# Patient Record
Sex: FEMALE | Race: OTHER | HISPANIC OR LATINO
[De-identification: names, ages, dates, MRNs, and addresses within clinical notes are randomized per-mention and may not be internally consistent; named-entity substitution may affect disease eponyms.]

---

## 2018-10-18 PROBLEM — Z00.00 ENCOUNTER FOR PREVENTIVE HEALTH EXAMINATION: Status: ACTIVE | Noted: 2018-10-18

## 2018-10-29 ENCOUNTER — APPOINTMENT (OUTPATIENT)
Dept: OTOLARYNGOLOGY | Facility: CLINIC | Age: 73
End: 2018-10-29
Payer: MEDICARE

## 2018-10-29 VITALS — SYSTOLIC BLOOD PRESSURE: 128 MMHG | DIASTOLIC BLOOD PRESSURE: 77 MMHG | HEIGHT: 64 IN

## 2018-10-29 VITALS — BODY MASS INDEX: 24.03 KG/M2 | WEIGHT: 140 LBS

## 2018-10-29 DIAGNOSIS — Z80.9 FAMILY HISTORY OF MALIGNANT NEOPLASM, UNSPECIFIED: ICD-10-CM

## 2018-10-29 DIAGNOSIS — Z78.9 OTHER SPECIFIED HEALTH STATUS: ICD-10-CM

## 2018-10-29 DIAGNOSIS — E78.00 PURE HYPERCHOLESTEROLEMIA, UNSPECIFIED: ICD-10-CM

## 2018-10-29 DIAGNOSIS — H54.7 UNSPECIFIED VISUAL LOSS: ICD-10-CM

## 2018-10-29 DIAGNOSIS — R42 DIZZINESS AND GIDDINESS: ICD-10-CM

## 2018-10-29 PROCEDURE — 99204 OFFICE O/P NEW MOD 45 MIN: CPT

## 2018-10-29 RX ORDER — ALENDRONATE SODIUM 70 MG/1
70 TABLET ORAL
Refills: 0 | Status: ACTIVE | COMMUNITY

## 2018-10-29 RX ORDER — SIMVASTATIN 5 MG/1
5 TABLET, FILM COATED ORAL
Refills: 0 | Status: ACTIVE | COMMUNITY

## 2018-10-29 RX ORDER — MECLIZINE HYDROCHLORIDE 12.5 MG/1
12.5 TABLET ORAL
Refills: 0 | Status: ACTIVE | COMMUNITY

## 2023-09-21 ENCOUNTER — NON-APPOINTMENT (OUTPATIENT)
Age: 78
End: 2023-09-21

## 2023-09-21 DIAGNOSIS — R55 SYNCOPE AND COLLAPSE: ICD-10-CM

## 2023-10-02 ENCOUNTER — INPATIENT (INPATIENT)
Facility: HOSPITAL | Age: 78
LOS: 2 days | Discharge: HOME | DRG: 390 | End: 2023-10-05
Attending: SURGERY | Admitting: SURGERY
Payer: MEDICARE

## 2023-10-02 VITALS
TEMPERATURE: 99 F | OXYGEN SATURATION: 97 % | RESPIRATION RATE: 18 BRPM | SYSTOLIC BLOOD PRESSURE: 142 MMHG | HEART RATE: 90 BPM | DIASTOLIC BLOOD PRESSURE: 82 MMHG

## 2023-10-02 DIAGNOSIS — K56.609 UNSPECIFIED INTESTINAL OBSTRUCTION, UNSPECIFIED AS TO PARTIAL VERSUS COMPLETE OBSTRUCTION: ICD-10-CM

## 2023-10-02 LAB
ALBUMIN SERPL ELPH-MCNC: 5.3 G/DL — HIGH (ref 3.5–5.2)
ALP SERPL-CCNC: 127 U/L — HIGH (ref 30–115)
ALT FLD-CCNC: 22 U/L — SIGNIFICANT CHANGE UP (ref 0–41)
ANION GAP SERPL CALC-SCNC: 15 MMOL/L — HIGH (ref 7–14)
ANION GAP SERPL CALC-SCNC: 9 MMOL/L — SIGNIFICANT CHANGE UP (ref 7–14)
APPEARANCE UR: CLEAR — SIGNIFICANT CHANGE UP
AST SERPL-CCNC: 22 U/L — SIGNIFICANT CHANGE UP (ref 0–41)
BACTERIA # UR AUTO: NEGATIVE /HPF — SIGNIFICANT CHANGE UP
BASOPHILS # BLD AUTO: 0.01 K/UL — SIGNIFICANT CHANGE UP (ref 0–0.2)
BASOPHILS # BLD AUTO: 0.01 K/UL — SIGNIFICANT CHANGE UP (ref 0–0.2)
BASOPHILS NFR BLD AUTO: 0.1 % — SIGNIFICANT CHANGE UP (ref 0–1)
BASOPHILS NFR BLD AUTO: 0.2 % — SIGNIFICANT CHANGE UP (ref 0–1)
BILIRUB SERPL-MCNC: 0.4 MG/DL — SIGNIFICANT CHANGE UP (ref 0.2–1.2)
BILIRUB UR-MCNC: NEGATIVE — SIGNIFICANT CHANGE UP
BUN SERPL-MCNC: 12 MG/DL — SIGNIFICANT CHANGE UP (ref 10–20)
BUN SERPL-MCNC: 14 MG/DL — SIGNIFICANT CHANGE UP (ref 10–20)
CALCIUM SERPL-MCNC: 7.5 MG/DL — LOW (ref 8.4–10.5)
CALCIUM SERPL-MCNC: 9.6 MG/DL — SIGNIFICANT CHANGE UP (ref 8.4–10.5)
CAST: 0 /LPF — SIGNIFICANT CHANGE UP (ref 0–4)
CHLORIDE SERPL-SCNC: 106 MMOL/L — SIGNIFICANT CHANGE UP (ref 98–110)
CHLORIDE SERPL-SCNC: 98 MMOL/L — SIGNIFICANT CHANGE UP (ref 98–110)
CO2 SERPL-SCNC: 24 MMOL/L — SIGNIFICANT CHANGE UP (ref 17–32)
CO2 SERPL-SCNC: 28 MMOL/L — SIGNIFICANT CHANGE UP (ref 17–32)
COLOR SPEC: YELLOW — SIGNIFICANT CHANGE UP
CREAT SERPL-MCNC: 0.5 MG/DL — LOW (ref 0.7–1.5)
CREAT SERPL-MCNC: 0.7 MG/DL — SIGNIFICANT CHANGE UP (ref 0.7–1.5)
DIFF PNL FLD: NEGATIVE — SIGNIFICANT CHANGE UP
EGFR: 89 ML/MIN/1.73M2 — SIGNIFICANT CHANGE UP
EGFR: 97 ML/MIN/1.73M2 — SIGNIFICANT CHANGE UP
EOSINOPHIL # BLD AUTO: 0.01 K/UL — SIGNIFICANT CHANGE UP (ref 0–0.7)
EOSINOPHIL # BLD AUTO: 0.05 K/UL — SIGNIFICANT CHANGE UP (ref 0–0.7)
EOSINOPHIL NFR BLD AUTO: 0.2 % — SIGNIFICANT CHANGE UP (ref 0–8)
EOSINOPHIL NFR BLD AUTO: 0.7 % — SIGNIFICANT CHANGE UP (ref 0–8)
FLUAV AG NPH QL: SIGNIFICANT CHANGE UP
FLUBV AG NPH QL: SIGNIFICANT CHANGE UP
GLUCOSE SERPL-MCNC: 100 MG/DL — HIGH (ref 70–99)
GLUCOSE SERPL-MCNC: 123 MG/DL — HIGH (ref 70–99)
GLUCOSE UR QL: NEGATIVE MG/DL — SIGNIFICANT CHANGE UP
HCT VFR BLD CALC: 36.9 % — LOW (ref 37–47)
HCT VFR BLD CALC: 45.2 % — SIGNIFICANT CHANGE UP (ref 37–47)
HGB BLD-MCNC: 12.1 G/DL — SIGNIFICANT CHANGE UP (ref 12–16)
HGB BLD-MCNC: 14.7 G/DL — SIGNIFICANT CHANGE UP (ref 12–16)
HYALINE CASTS # UR AUTO: 0 /LPF — SIGNIFICANT CHANGE UP (ref 0–4)
IMM GRANULOCYTES NFR BLD AUTO: 0.2 % — SIGNIFICANT CHANGE UP (ref 0.1–0.3)
IMM GRANULOCYTES NFR BLD AUTO: 0.3 % — SIGNIFICANT CHANGE UP (ref 0.1–0.3)
KETONES UR-MCNC: NEGATIVE MG/DL — SIGNIFICANT CHANGE UP
LACTATE SERPL-SCNC: 1.4 MMOL/L — SIGNIFICANT CHANGE UP (ref 0.7–2)
LEUKOCYTE ESTERASE UR-ACNC: ABNORMAL
LIDOCAIN IGE QN: 80 U/L — HIGH (ref 7–60)
LYMPHOCYTES # BLD AUTO: 0.41 K/UL — LOW (ref 1.2–3.4)
LYMPHOCYTES # BLD AUTO: 0.72 K/UL — LOW (ref 1.2–3.4)
LYMPHOCYTES # BLD AUTO: 8.2 % — LOW (ref 20.5–51.1)
LYMPHOCYTES # BLD AUTO: 9.9 % — LOW (ref 20.5–51.1)
MAGNESIUM SERPL-MCNC: 1.8 MG/DL — SIGNIFICANT CHANGE UP (ref 1.8–2.4)
MCHC RBC-ENTMCNC: 27.4 PG — SIGNIFICANT CHANGE UP (ref 27–31)
MCHC RBC-ENTMCNC: 27.8 PG — SIGNIFICANT CHANGE UP (ref 27–31)
MCHC RBC-ENTMCNC: 32.5 G/DL — SIGNIFICANT CHANGE UP (ref 32–37)
MCHC RBC-ENTMCNC: 32.8 G/DL — SIGNIFICANT CHANGE UP (ref 32–37)
MCV RBC AUTO: 84.3 FL — SIGNIFICANT CHANGE UP (ref 81–99)
MCV RBC AUTO: 84.6 FL — SIGNIFICANT CHANGE UP (ref 81–99)
MONOCYTES # BLD AUTO: 0.23 K/UL — SIGNIFICANT CHANGE UP (ref 0.1–0.6)
MONOCYTES # BLD AUTO: 0.84 K/UL — HIGH (ref 0.1–0.6)
MONOCYTES NFR BLD AUTO: 11.6 % — HIGH (ref 1.7–9.3)
MONOCYTES NFR BLD AUTO: 4.6 % — SIGNIFICANT CHANGE UP (ref 1.7–9.3)
NEUTROPHILS # BLD AUTO: 4.32 K/UL — SIGNIFICANT CHANGE UP (ref 1.4–6.5)
NEUTROPHILS # BLD AUTO: 5.63 K/UL — SIGNIFICANT CHANGE UP (ref 1.4–6.5)
NEUTROPHILS NFR BLD AUTO: 77.4 % — HIGH (ref 42.2–75.2)
NEUTROPHILS NFR BLD AUTO: 86.6 % — HIGH (ref 42.2–75.2)
NITRITE UR-MCNC: NEGATIVE — SIGNIFICANT CHANGE UP
NRBC # BLD: 0 /100 WBCS — SIGNIFICANT CHANGE UP (ref 0–0)
NRBC # BLD: 0 /100 WBCS — SIGNIFICANT CHANGE UP (ref 0–0)
PH UR: 8.5 (ref 5–8)
PHOSPHATE SERPL-MCNC: 2.8 MG/DL — SIGNIFICANT CHANGE UP (ref 2.1–4.9)
PLATELET # BLD AUTO: 187 K/UL — SIGNIFICANT CHANGE UP (ref 130–400)
PLATELET # BLD AUTO: 236 K/UL — SIGNIFICANT CHANGE UP (ref 130–400)
PMV BLD: 11.4 FL — HIGH (ref 7.4–10.4)
PMV BLD: 11.7 FL — HIGH (ref 7.4–10.4)
POTASSIUM SERPL-MCNC: 4 MMOL/L — SIGNIFICANT CHANGE UP (ref 3.5–5)
POTASSIUM SERPL-MCNC: 4.2 MMOL/L — SIGNIFICANT CHANGE UP (ref 3.5–5)
POTASSIUM SERPL-SCNC: 4 MMOL/L — SIGNIFICANT CHANGE UP (ref 3.5–5)
POTASSIUM SERPL-SCNC: 4.2 MMOL/L — SIGNIFICANT CHANGE UP (ref 3.5–5)
PROT SERPL-MCNC: 8.8 G/DL — HIGH (ref 6–8)
PROT UR-MCNC: 30 MG/DL
RBC # BLD: 4.36 M/UL — SIGNIFICANT CHANGE UP (ref 4.2–5.4)
RBC # BLD: 5.36 M/UL — SIGNIFICANT CHANGE UP (ref 4.2–5.4)
RBC # FLD: 13.8 % — SIGNIFICANT CHANGE UP (ref 11.5–14.5)
RBC # FLD: 14 % — SIGNIFICANT CHANGE UP (ref 11.5–14.5)
RBC CASTS # UR COMP ASSIST: 22 /HPF — HIGH (ref 0–4)
RSV RNA NPH QL NAA+NON-PROBE: SIGNIFICANT CHANGE UP
SARS-COV-2 RNA SPEC QL NAA+PROBE: SIGNIFICANT CHANGE UP
SODIUM SERPL-SCNC: 139 MMOL/L — SIGNIFICANT CHANGE UP (ref 135–146)
SODIUM SERPL-SCNC: 141 MMOL/L — SIGNIFICANT CHANGE UP (ref 135–146)
SP GR SPEC: 1.01 — SIGNIFICANT CHANGE UP (ref 1–1.03)
SQUAMOUS # UR AUTO: 4 /HPF — SIGNIFICANT CHANGE UP (ref 0–5)
TROPONIN T SERPL-MCNC: <0.01 NG/ML — SIGNIFICANT CHANGE UP
UROBILINOGEN FLD QL: 0.2 MG/DL — SIGNIFICANT CHANGE UP (ref 0.2–1)
WBC # BLD: 4.99 K/UL — SIGNIFICANT CHANGE UP (ref 4.8–10.8)
WBC # BLD: 7.27 K/UL — SIGNIFICANT CHANGE UP (ref 4.8–10.8)
WBC # FLD AUTO: 4.99 K/UL — SIGNIFICANT CHANGE UP (ref 4.8–10.8)
WBC # FLD AUTO: 7.27 K/UL — SIGNIFICANT CHANGE UP (ref 4.8–10.8)
WBC UR QL: 10 /HPF — HIGH (ref 0–5)

## 2023-10-02 PROCEDURE — 83735 ASSAY OF MAGNESIUM: CPT

## 2023-10-02 PROCEDURE — 71250 CT THORAX DX C-: CPT | Mod: 26,MA

## 2023-10-02 PROCEDURE — 74177 CT ABD & PELVIS W/CONTRAST: CPT | Mod: 26,MA

## 2023-10-02 PROCEDURE — 74021 RADEX ABDOMEN 3+ VIEWS: CPT

## 2023-10-02 PROCEDURE — 71045 X-RAY EXAM CHEST 1 VIEW: CPT | Mod: 26

## 2023-10-02 PROCEDURE — 93005 ELECTROCARDIOGRAM TRACING: CPT

## 2023-10-02 PROCEDURE — 99222 1ST HOSP IP/OBS MODERATE 55: CPT

## 2023-10-02 PROCEDURE — 93010 ELECTROCARDIOGRAM REPORT: CPT | Mod: 77

## 2023-10-02 PROCEDURE — 36415 COLL VENOUS BLD VENIPUNCTURE: CPT

## 2023-10-02 PROCEDURE — 99285 EMERGENCY DEPT VISIT HI MDM: CPT

## 2023-10-02 PROCEDURE — 80048 BASIC METABOLIC PNL TOTAL CA: CPT

## 2023-10-02 PROCEDURE — 84100 ASSAY OF PHOSPHORUS: CPT

## 2023-10-02 PROCEDURE — 86803 HEPATITIS C AB TEST: CPT

## 2023-10-02 PROCEDURE — 85025 COMPLETE CBC W/AUTO DIFF WBC: CPT

## 2023-10-02 RX ORDER — ACETAMINOPHEN 500 MG
650 TABLET ORAL EVERY 6 HOURS
Refills: 0 | Status: DISCONTINUED | OUTPATIENT
Start: 2023-10-02 | End: 2023-10-05

## 2023-10-02 RX ORDER — SODIUM CHLORIDE 9 MG/ML
1000 INJECTION INTRAMUSCULAR; INTRAVENOUS; SUBCUTANEOUS ONCE
Refills: 0 | Status: COMPLETED | OUTPATIENT
Start: 2023-10-02 | End: 2023-10-02

## 2023-10-02 RX ORDER — SODIUM CHLORIDE 9 MG/ML
1000 INJECTION, SOLUTION INTRAVENOUS ONCE
Refills: 0 | Status: COMPLETED | OUTPATIENT
Start: 2023-10-02 | End: 2023-10-02

## 2023-10-02 RX ORDER — FAMOTIDINE 10 MG/ML
20 INJECTION INTRAVENOUS ONCE
Refills: 0 | Status: COMPLETED | OUTPATIENT
Start: 2023-10-02 | End: 2023-10-02

## 2023-10-02 RX ORDER — ENOXAPARIN SODIUM 100 MG/ML
40 INJECTION SUBCUTANEOUS EVERY 24 HOURS
Refills: 0 | Status: DISCONTINUED | OUTPATIENT
Start: 2023-10-02 | End: 2023-10-05

## 2023-10-02 RX ORDER — ACETAMINOPHEN 500 MG
975 TABLET ORAL ONCE
Refills: 0 | Status: COMPLETED | OUTPATIENT
Start: 2023-10-02 | End: 2023-10-02

## 2023-10-02 RX ORDER — ONDANSETRON 8 MG/1
4 TABLET, FILM COATED ORAL EVERY 8 HOURS
Refills: 0 | Status: DISCONTINUED | OUTPATIENT
Start: 2023-10-02 | End: 2023-10-05

## 2023-10-02 RX ORDER — SODIUM CHLORIDE 9 MG/ML
1000 INJECTION, SOLUTION INTRAVENOUS
Refills: 0 | Status: DISCONTINUED | OUTPATIENT
Start: 2023-10-02 | End: 2023-10-03

## 2023-10-02 RX ORDER — ONDANSETRON 8 MG/1
4 TABLET, FILM COATED ORAL ONCE
Refills: 0 | Status: COMPLETED | OUTPATIENT
Start: 2023-10-02 | End: 2023-10-02

## 2023-10-02 RX ORDER — METOCLOPRAMIDE HCL 10 MG
10 TABLET ORAL ONCE
Refills: 0 | Status: COMPLETED | OUTPATIENT
Start: 2023-10-02 | End: 2023-10-02

## 2023-10-02 RX ADMIN — SODIUM CHLORIDE 1000 MILLILITER(S): 9 INJECTION, SOLUTION INTRAVENOUS at 14:06

## 2023-10-02 RX ADMIN — SODIUM CHLORIDE 1000 MILLILITER(S): 9 INJECTION INTRAMUSCULAR; INTRAVENOUS; SUBCUTANEOUS at 09:27

## 2023-10-02 RX ADMIN — SODIUM CHLORIDE 100 MILLILITER(S): 9 INJECTION, SOLUTION INTRAVENOUS at 15:44

## 2023-10-02 RX ADMIN — ONDANSETRON 4 MILLIGRAM(S): 8 TABLET, FILM COATED ORAL at 09:27

## 2023-10-02 RX ADMIN — Medication 650 MILLIGRAM(S): at 16:26

## 2023-10-02 RX ADMIN — FAMOTIDINE 20 MILLIGRAM(S): 10 INJECTION INTRAVENOUS at 09:27

## 2023-10-02 RX ADMIN — Medication 975 MILLIGRAM(S): at 11:26

## 2023-10-02 RX ADMIN — Medication 10 MILLIGRAM(S): at 11:27

## 2023-10-02 NOTE — H&P ADULT - HISTORY OF PRESENT ILLNESS
GENERAL SURGERY CONSULT NOTE    Patient: MANN ADAMS , 78y ([birth year])Female   MRN: [id number]  Location: [facility] ED Hold 017 A  Visit: 10-02-23 Inpatient  Date: 10-02-23 @ 15:16    HPI: PENDING      PAST MEDICAL & SURGICAL HISTORY:  -HLD  -ASTHMA      SX HX:  -BILATERAL SALPINGECTOMY         Home Medications:  Albuterol (Eqv-ProAir HFA) 90 mcg/inh inhalation aerosol: 2 puff(s) inhaled 3 times a day as needed for  bronchospasm (02 Oct 2023 15:12)  atorvastatin 40 mg oral tablet: 1 tab(s) orally once a day (02 Oct 2023 15:12)     GENERAL SURGERY CONSULT NOTE    Patient: MANN ADAMS , 78y ([birth year])Female   MRN: [id number]  Location: [facility] ED Hold 017 A  Visit: 10-02-23 Inpatient  Date: 10-02-23 @ 15:16  : RAQUEL [id number]    HPI: 79 yo female with a medical history of asthma, hyperlipidemia, s/p bilateral salpingectomy (40 years ago)  presents to the ED complaining of abdominal pain for the past 2 days, associated with multiple episodes of bilious vomiting x 2 days. No palliating or provoking factors. Rosa Isela fevers, diarrhea, chills, SOB, CP. Last bowel movement yesterday overnight and passing flatus this AM. Patient has never had any episode like this before.   . Workup in the Emergency department included CT showing  Small bowel dilatation with apparent transition point in the left quadrant.       PAST MEDICAL & SURGICAL HISTORY:  -HLD  -ASTHMA      SX HX:  -BILATERAL SALPINGECTOMY         Home Medications:  Albuterol (Eqv-ProAir HFA) 90 mcg/inh inhalation aerosol: 2 puff(s) inhaled 3 times a day as needed for  bronchospasm (02 Oct 2023 15:12)  atorvastatin 40 mg oral tablet: 1 tab(s) orally once a day (02 Oct 2023 15:12)

## 2023-10-02 NOTE — ED PROVIDER NOTE - ATTENDING APP SHARED VISIT CONTRIBUTION OF CARE
I have personally performed a history and physical exam on this patient and personally directed the management of the patient.  77 yo female presents to the ED complaining of vomiting. Patient w/ multiple episodes of bilious vomiting x 2 days. Associated epigastric abd pain. No palliating or provoking factors. Rosa Isela fevers, diarrhea, chills, SOB, CP. here pt was seen to have bilious emesis, last meal was last night dinner sweet potato.  CON: appears stated age, pleasant, no acute distress, HENMT: normocephalic, atraumatic, anicteric, no conjunctival injection,  CV: regular rhythm, distal pulses intact, RESP: no acute respiratory distress, no stridor, breathing comfortably on RA , GI:  soft, nontender, no rebound, no guarding, SKIN: no wounds MSK: no deformities, NEURO: no gross motor or sensory deficit Psychiatric: appropriate mood, appropriate affect  will send labs, anti-emesis meds and ct ap and ivf and reevaluate

## 2023-10-02 NOTE — H&P ADULT - ASSESSMENT
ASSESSMENT:  78yF w/ PMHx of  ***  who presented with ***. Physical exam findings, imaging, and labs as documented above.     PENDING    PLAN:  #Small bowel obstruction  -NPO  -mIVF while NPO  -DVT: Enoxaparin 40mg QD  -Zofran 4mg every 8 hours PRN for nausea  -If patient becomes nauseous, plan to place NGT  -Patient currently passing gas from below, may advance diet tomorrow.         Above plan discussed with Attending Surgeon Dr. Rosas  , patient, patient family, and Primary team  10-02-23 @ 15:16       ASSESSMENT:  77 yo female with a medical history of asthma, hyperlipidemia, s/p bilateral salpingectomy (40 years ago)  who presented with abdominal pain and nasuesas. Physical exam findings, imaging, and labs as documented above. CT showing  Small bowel dilatation with apparent transition point in the left quadrant.         PENDING    PLAN:  #Small bowel obstruction  -NPO  -mIVF while NPO  -DVT: Enoxaparin 40mg QD  -Zofran 4mg every 8 hours PRN for nausea  -If patient becomes nauseous, plan to place NGT  -Patient currently passing gas from below, may advance diet tomorrow.         Above plan discussed with Attending Surgeon Dr. Rosas  , patient, patient family, and Primary team  10-02-23 @ 15:16

## 2023-10-02 NOTE — H&P ADULT - NSHPLABSRESULTS_GEN_ALL_CORE
LAB/STUDIES:                        14.7   4.99  )-----------( 236      ( 02 Oct 2023 09:19 )             45.2     10    141  |  98  |  12  ----------------------------<  123<H>  4.2   |  28  |  0.7    Ca    9.6      02 Oct 2023 09:19    TPro  8.8<H>  /  Alb  5.3<H>  /  TBili  0.4  /  DBili  x   /  AST  22  /  ALT  22  /  AlkPhos  127<H>  10      LIVER FUNCTIONS - ( 02 Oct 2023 09:19 )  Alb: 5.3 g/dL / Pro: 8.8 g/dL / ALK PHOS: 127 U/L / ALT: 22 U/L / AST: 22 U/L / GGT: x           Urinalysis Basic - ( 02 Oct 2023 10:00 )    Color: Yellow / Appearance: Clear / S.015 / pH: x  Gluc: x / Ketone: Negative mg/dL  / Bili: Negative / Urobili: 0.2 mg/dL   Blood: x / Protein: 30 mg/dL / Nitrite: Negative   Leuk Esterase: Small / RBC: 22 /HPF / WBC 10 /HPF   Sq Epi: x / Non Sq Epi: 4 /HPF / Bacteria: Negative /HPF      CARDIAC MARKERS ( 02 Oct 2023 09:19 )  x     / <0.01 ng/mL / x     / x     / x                  IMAGING:      ACCESS DEVICES:  [ ] Peripheral IV  [ ] Central Venous Line	[ ] R	[ ] L	[ ] IJ	[ ] Fem	[ ] SC	Placed:   [ ] Arterial Line		[ ] R	[ ] L	[ ] Fem	[ ] Rad	[ ] Ax	Placed:   [ ] PICC:					[ ] Mediport  [ ] Urinary Catheter, Date Placed:

## 2023-10-02 NOTE — ED ADULT TRIAGE NOTE - CHIEF COMPLAINT QUOTE
Patient BIBA from home with complaints of vomiting, heartburn, and headache that started 1 day ago. Patient legally blind, reports PMH of gallstones.

## 2023-10-02 NOTE — ED PROVIDER NOTE - PHYSICAL EXAMINATION
CONST: Well appearing in NAD  EYES: PERRL, EOMI, Sclera and conjunctiva clear.   ENT: Oropharynx normal appearing, no erythema or exudates. Uvula midline.  CARD: Normal S1 S2; Normal rate and rhythm  RESP: Equal BS B/L, No wheezes, rhonchi or rales. No distress  GI: Mild epigastric tenderness, no rebound or guarding. Billious green vomitus.   MS: Normal ROM in all extremities. No midline spinal tenderness.  SKIN: Warm, dry, no acute rashes. Good turgor  NEURO: A&Ox3, No focal deficits. Strength 5/5 with no sensory deficits. Steady gait

## 2023-10-02 NOTE — ED PROVIDER NOTE - OBJECTIVE STATEMENT
77 yo female presents to the ED complaining of vomiting. Patient w/ multiple episodes of bilious vomiting x 2 days. Associated epigastric abd pain. No palliating or provoking factors. Rosa Isela fevers, diarrhea, chills, SOB, CP.

## 2023-10-02 NOTE — ED PROVIDER NOTE - CLINICAL SUMMARY MEDICAL DECISION MAKING FREE TEXT BOX
77 yo female presents to the ED complaining of vomiting. Patient w/ multiple episodes of bilious vomiting x 2 days. Associated epigastric abd pain. No palliating or provoking factors. labs unremarkable, slight elevation on lipase and alk.p, ct showed SBO, surgery consulted recommended admission, pt made NPO, IVF given and admitted to floor.

## 2023-10-02 NOTE — ED ADULT NURSE NOTE - NSFALLHARMRISKINTERV_ED_ALL_ED

## 2023-10-02 NOTE — H&P ADULT - ATTENDING COMMENTS
ACS Attending Note Attestation    Patient was seen and examined bedside earlier today in the ED. Treatment plan discussed with the team and consulting physicians and consulting teams. Medications, radiological studies and all other relevant studies reviewed. I reviewed the resident/PA note and agreed with above assessment and plan with following additions and corrections.  ID [id number].    MANN ADAMS Patient is a 78y old  Female who presents with a chief complaint of SBO (02 Oct 2023 15:04)    Vital Signs Last 24 Hrs  T(C): 37.1 (02 Oct 2023 21:03), Max: 38.7 (02 Oct 2023 15:33)  T(F): 98.7 (02 Oct 2023 21:03), Max: 101.6 (02 Oct 2023 15:33)  HR: 91 (02 Oct 2023 21:03) (90 - 109)  BP: 104/52 (02 Oct 2023 21:03) (104/52 - 148/67)  BP(mean): --  RR: 18 (02 Oct 2023 21:03) (16 - 18)  SpO2: 95% (02 Oct 2023 21:03) (95% - 98%)    Parameters below as of 02 Oct 2023 21:03  Patient On (Oxygen Delivery Method): room air                          12.1   7.27  )-----------( 187      ( 02 Oct 2023 21:22 )             36.9     10-02    139  |  106  |  14  ----------------------------<  100<H>  4.0   |  24  |  0.5<L>    Ca    7.5<L>      02 Oct 2023 21:22  Phos  2.8     10-02  Mg     1.8     10-02    TPro  8.8<H>  /  Alb  5.3<H>  /  TBili  0.4  /  DBili  x   /  AST  22  /  ALT  22  /  AlkPhos  127<H>  10-02      Assessment: 78y old  Female with past surgical history of bilateral salpingectomy who presents with symptoms and CT scan findings c/f SBO. Of note, patient reports passing flatus earlier today after an episode of vomiting and having a BM yesterday. At the time of my assessment, she was no longer having nausea, and her abdominal exam was benign. Trial of conservative therapy was warranted as her SBO may be resolving. Low threshold to place NGT.    Plan:	  - Supportive care  - Keep NPO for now with aspiration precautions  - NGT placement if patient becomes nauseous or vomits    Rob Rosas MD  Trauma/ACS/Surcical Critical care Attending

## 2023-10-02 NOTE — ED PROVIDER NOTE - NS ED ATTENDING STATEMENT MOD
This was a shared visit with the TEJ. I reviewed and verified the documentation and independently performed the documented:

## 2023-10-02 NOTE — ED ADULT NURSE NOTE - OBJECTIVE STATEMENT
Patient BIBA from home with complaints of vomiting, heartburn, and headache that started 1 day ago. Patient legally blind, reports PMH of gallstones. Bilious vomiting noted

## 2023-10-03 LAB
ANION GAP SERPL CALC-SCNC: 11 MMOL/L — SIGNIFICANT CHANGE UP (ref 7–14)
BASOPHILS # BLD AUTO: 0.01 K/UL — SIGNIFICANT CHANGE UP (ref 0–0.2)
BASOPHILS NFR BLD AUTO: 0.2 % — SIGNIFICANT CHANGE UP (ref 0–1)
BUN SERPL-MCNC: 15 MG/DL — SIGNIFICANT CHANGE UP (ref 10–20)
CALCIUM SERPL-MCNC: 7.9 MG/DL — LOW (ref 8.4–10.4)
CHLORIDE SERPL-SCNC: 105 MMOL/L — SIGNIFICANT CHANGE UP (ref 98–110)
CO2 SERPL-SCNC: 25 MMOL/L — SIGNIFICANT CHANGE UP (ref 17–32)
CREAT SERPL-MCNC: 0.6 MG/DL — LOW (ref 0.7–1.5)
EGFR: 92 ML/MIN/1.73M2 — SIGNIFICANT CHANGE UP
EOSINOPHIL # BLD AUTO: 0.18 K/UL — SIGNIFICANT CHANGE UP (ref 0–0.7)
EOSINOPHIL NFR BLD AUTO: 3.8 % — SIGNIFICANT CHANGE UP (ref 0–8)
GLUCOSE SERPL-MCNC: 80 MG/DL — SIGNIFICANT CHANGE UP (ref 70–99)
HCT VFR BLD CALC: 38.5 % — SIGNIFICANT CHANGE UP (ref 37–47)
HCV AB S/CO SERPL IA: 0.04 COI — SIGNIFICANT CHANGE UP
HCV AB SERPL-IMP: SIGNIFICANT CHANGE UP
HGB BLD-MCNC: 11.9 G/DL — LOW (ref 12–16)
IMM GRANULOCYTES NFR BLD AUTO: 0.2 % — SIGNIFICANT CHANGE UP (ref 0.1–0.3)
LYMPHOCYTES # BLD AUTO: 0.97 K/UL — LOW (ref 1.2–3.4)
LYMPHOCYTES # BLD AUTO: 20.3 % — LOW (ref 20.5–51.1)
MAGNESIUM SERPL-MCNC: 2.4 MG/DL — SIGNIFICANT CHANGE UP (ref 1.8–2.4)
MCHC RBC-ENTMCNC: 26.4 PG — LOW (ref 27–31)
MCHC RBC-ENTMCNC: 30.9 G/DL — LOW (ref 32–37)
MCV RBC AUTO: 85.4 FL — SIGNIFICANT CHANGE UP (ref 81–99)
MONOCYTES # BLD AUTO: 0.52 K/UL — SIGNIFICANT CHANGE UP (ref 0.1–0.6)
MONOCYTES NFR BLD AUTO: 10.9 % — HIGH (ref 1.7–9.3)
NEUTROPHILS # BLD AUTO: 3.09 K/UL — SIGNIFICANT CHANGE UP (ref 1.4–6.5)
NEUTROPHILS NFR BLD AUTO: 64.6 % — SIGNIFICANT CHANGE UP (ref 42.2–75.2)
NRBC # BLD: 0 /100 WBCS — SIGNIFICANT CHANGE UP (ref 0–0)
PHOSPHATE SERPL-MCNC: 2.2 MG/DL — SIGNIFICANT CHANGE UP (ref 2.1–4.9)
PLATELET # BLD AUTO: 207 K/UL — SIGNIFICANT CHANGE UP (ref 130–400)
PMV BLD: 11.6 FL — HIGH (ref 7.4–10.4)
POTASSIUM SERPL-MCNC: 3.8 MMOL/L — SIGNIFICANT CHANGE UP (ref 3.5–5)
POTASSIUM SERPL-SCNC: 3.8 MMOL/L — SIGNIFICANT CHANGE UP (ref 3.5–5)
RBC # BLD: 4.51 M/UL — SIGNIFICANT CHANGE UP (ref 4.2–5.4)
RBC # FLD: 14 % — SIGNIFICANT CHANGE UP (ref 11.5–14.5)
SODIUM SERPL-SCNC: 141 MMOL/L — SIGNIFICANT CHANGE UP (ref 135–146)
WBC # BLD: 4.78 K/UL — LOW (ref 4.8–10.8)
WBC # FLD AUTO: 4.78 K/UL — LOW (ref 4.8–10.8)

## 2023-10-03 PROCEDURE — 74021 RADEX ABDOMEN 3+ VIEWS: CPT | Mod: 26

## 2023-10-03 PROCEDURE — 99232 SBSQ HOSP IP/OBS MODERATE 35: CPT

## 2023-10-03 RX ORDER — CEFTRIAXONE 500 MG/1
1000 INJECTION, POWDER, FOR SOLUTION INTRAMUSCULAR; INTRAVENOUS EVERY 24 HOURS
Refills: 0 | Status: DISCONTINUED | OUTPATIENT
Start: 2023-10-04 | End: 2023-10-05

## 2023-10-03 RX ORDER — POTASSIUM CHLORIDE 20 MEQ
20 PACKET (EA) ORAL ONCE
Refills: 0 | Status: COMPLETED | OUTPATIENT
Start: 2023-10-03 | End: 2023-10-03

## 2023-10-03 RX ORDER — CEFTRIAXONE 500 MG/1
1000 INJECTION, POWDER, FOR SOLUTION INTRAMUSCULAR; INTRAVENOUS ONCE
Refills: 0 | Status: COMPLETED | OUTPATIENT
Start: 2023-10-03 | End: 2023-10-03

## 2023-10-03 RX ORDER — SODIUM,POTASSIUM PHOSPHATES 278-250MG
1 POWDER IN PACKET (EA) ORAL ONCE
Refills: 0 | Status: COMPLETED | OUTPATIENT
Start: 2023-10-03 | End: 2023-10-03

## 2023-10-03 RX ORDER — SODIUM,POTASSIUM PHOSPHATES 278-250MG
2 POWDER IN PACKET (EA) ORAL
Refills: 0 | Status: DISCONTINUED | OUTPATIENT
Start: 2023-10-03 | End: 2023-10-04

## 2023-10-03 RX ORDER — IOHEXOL 300 MG/ML
30 INJECTION, SOLUTION INTRAVENOUS ONCE
Refills: 0 | Status: COMPLETED | OUTPATIENT
Start: 2023-10-03 | End: 2023-10-03

## 2023-10-03 RX ORDER — SODIUM CHLORIDE 9 MG/ML
1000 INJECTION, SOLUTION INTRAVENOUS
Refills: 0 | Status: DISCONTINUED | OUTPATIENT
Start: 2023-10-03 | End: 2023-10-04

## 2023-10-03 RX ORDER — CEFTRIAXONE 500 MG/1
INJECTION, POWDER, FOR SOLUTION INTRAMUSCULAR; INTRAVENOUS
Refills: 0 | Status: DISCONTINUED | OUTPATIENT
Start: 2023-10-03 | End: 2023-10-05

## 2023-10-03 RX ORDER — MAGNESIUM SULFATE 500 MG/ML
2 VIAL (ML) INJECTION ONCE
Refills: 0 | Status: COMPLETED | OUTPATIENT
Start: 2023-10-03 | End: 2023-10-03

## 2023-10-03 RX ADMIN — ENOXAPARIN SODIUM 40 MILLIGRAM(S): 100 INJECTION SUBCUTANEOUS at 23:44

## 2023-10-03 RX ADMIN — IOHEXOL 30 MILLILITER(S): 300 INJECTION, SOLUTION INTRAVENOUS at 10:19

## 2023-10-03 RX ADMIN — Medication 650 MILLIGRAM(S): at 06:34

## 2023-10-03 RX ADMIN — ENOXAPARIN SODIUM 40 MILLIGRAM(S): 100 INJECTION SUBCUTANEOUS at 00:20

## 2023-10-03 RX ADMIN — Medication 20 MILLIEQUIVALENT(S): at 23:48

## 2023-10-03 RX ADMIN — Medication 650 MILLIGRAM(S): at 00:20

## 2023-10-03 RX ADMIN — Medication 25 GRAM(S): at 02:37

## 2023-10-03 RX ADMIN — Medication 1 PACKET(S): at 23:44

## 2023-10-03 RX ADMIN — CEFTRIAXONE 100 MILLIGRAM(S): 500 INJECTION, POWDER, FOR SOLUTION INTRAMUSCULAR; INTRAVENOUS at 14:25

## 2023-10-03 RX ADMIN — Medication 650 MILLIGRAM(S): at 20:18

## 2023-10-03 NOTE — PATIENT PROFILE ADULT - FALL HARM RISK - HARM RISK INTERVENTIONS

## 2023-10-03 NOTE — PROGRESS NOTE ADULT - SUBJECTIVE AND OBJECTIVE BOX
GENERAL SURGERY PROGRESS NOTE     MANN ADAMS  78y  Female  Hospital day :1d     OVERNIGHT EVENTS: No acute events overnight. Patient remains HDS, afebrile and saturating appropriately. Patient denies any active nausea or vomitting - passing flatus but no bowel movements.     T(F): 97.9 (10-03-23 @ 08:28), Max: 101.6 (10-02-23 @ 15:33)  HR: 79 (10-03-23 @ 08:28) (79 - 109)  BP: 141/61 (10-03-23 @ 08:28) (101/56 - 148/67)  ABP: --  ABP(mean): --  RR: 18 (10-03-23 @ 08:28) (16 - 18)  SpO2: 94% (10-03-23 @ 08:28) (94% - 98%)    DIET/FLUIDS: lactated ringers. 1000 milliLiter(s) IV Continuous <Continuous>  potassium phosphate / sodium phosphate Powder (PHOS-NaK) 1 Packet(s) Oral once     GI proph:    AC/ proph: enoxaparin Injectable 40 milliGRAM(s) SubCutaneous every 24 hours    ABx: cefTRIAXone   IVPB 1000 milliGRAM(s) IV Intermittent once  cefTRIAXone   IVPB          PHYSICAL EXAM:  GENERAL: NAD   ABDOMEN: Soft, Nontender, Nondistended;   EXTREMITIES:  No clubbing, cyanosis, or edema      LABS  Labs:  CAPILLARY BLOOD GLUCOSE                              12.1   7.27  )-----------( 187      ( 02 Oct 2023 21:22 )             36.9       Auto Neutrophil %: 77.4 % (10-02-23 @ 21:22)  Auto Immature Granulocyte %: 0.3 % (10-02-23 @ 21:22)    10-02    139  |  106  |  14  ----------------------------<  100<H>  4.0   |  24  |  0.5<L>      Calcium: 7.5 mg/dL (10-02-23 @ 21:22)      LFTs:             8.8  | 0.4  | 22       ------------------[127     ( 02 Oct 2023 09:19 )  5.3  | x    | 22          Lipase:80     Amylase:x         Lactate, Blood: 1.4 mmol/L (10-02-23 @ 09:19)      Coags:    CARDIAC MARKERS ( 02 Oct 2023 09:19 )  x     / <0.01 ng/mL / x     / x     / x              Urinalysis Basic - ( 02 Oct 2023 21:22 )    Color: x / Appearance: x / SG: x / pH: x  Gluc: 100 mg/dL / Ketone: x  / Bili: x / Urobili: x   Blood: x / Protein: x / Nitrite: x   Leuk Esterase: x / RBC: x / WBC x   Sq Epi: x / Non Sq Epi: x / Bacteria: x            RADIOLOGY & ADDITIONAL TESTS:      A/P    77 yo female with a medical history of asthma, hyperlipidemia, s/p bilateral salpingectomy (40 years ago)  who presented with abdominal pain and nasuesas. Physical exam findings, imaging, and labs as documented above. CT showing  Small bowel dilatation with apparent transition point in the left quadrant.       -NPO  -IVF  -Monitor bowel function  -Monitor vitals   -DVT: Enoxaparin 40mg QD  -Zofran 4mg every 8 hours PRN for nausea  -Low threshold for NGT placement if nausea/emesis occurs        GENERAL SURGERY PROGRESS NOTE     MANN ADAMS  78y  Female  Hospital day :1d     OVERNIGHT EVENTS: No acute events overnight. Patient remains HDS, afebrile and saturating appropriately. Patient denies any active nausea or vomitting - passing flatus but no bowel movements.     T(F): 97.9 (10-03-23 @ 08:28), Max: 101.6 (10-02-23 @ 15:33)  HR: 79 (10-03-23 @ 08:28) (79 - 109)  BP: 141/61 (10-03-23 @ 08:28) (101/56 - 148/67)  ABP: --  ABP(mean): --  RR: 18 (10-03-23 @ 08:28) (16 - 18)  SpO2: 94% (10-03-23 @ 08:28) (94% - 98%)    DIET/FLUIDS: lactated ringers. 1000 milliLiter(s) IV Continuous <Continuous>  potassium phosphate / sodium phosphate Powder (PHOS-NaK) 1 Packet(s) Oral once     GI proph:    AC/ proph: enoxaparin Injectable 40 milliGRAM(s) SubCutaneous every 24 hours    ABx: cefTRIAXone   IVPB 1000 milliGRAM(s) IV Intermittent once  cefTRIAXone   IVPB          PHYSICAL EXAM:  GENERAL: NAD   ABDOMEN: Soft, Nontender, Nondistended;   EXTREMITIES:  No clubbing, cyanosis, or edema      LABS  Labs:  CAPILLARY BLOOD GLUCOSE                              12.1   7.27  )-----------( 187      ( 02 Oct 2023 21:22 )             36.9       Auto Neutrophil %: 77.4 % (10-02-23 @ 21:22)  Auto Immature Granulocyte %: 0.3 % (10-02-23 @ 21:22)    10-02    139  |  106  |  14  ----------------------------<  100<H>  4.0   |  24  |  0.5<L>      Calcium: 7.5 mg/dL (10-02-23 @ 21:22)      LFTs:             8.8  | 0.4  | 22       ------------------[127     ( 02 Oct 2023 09:19 )  5.3  | x    | 22          Lipase:80     Amylase:x         Lactate, Blood: 1.4 mmol/L (10-02-23 @ 09:19)      Coags:    CARDIAC MARKERS ( 02 Oct 2023 09:19 )  x     / <0.01 ng/mL / x     / x     / x              Urinalysis Basic - ( 02 Oct 2023 21:22 )    Color: x / Appearance: x / SG: x / pH: x  Gluc: 100 mg/dL / Ketone: x  / Bili: x / Urobili: x   Blood: x / Protein: x / Nitrite: x   Leuk Esterase: x / RBC: x / WBC x   Sq Epi: x / Non Sq Epi: x / Bacteria: x            RADIOLOGY & ADDITIONAL TESTS:      A/P    79 yo female with a medical history of asthma, hyperlipidemia, s/p bilateral salpingectomy (40 years ago)  who presented with abdominal pain and nasuesas. Physical exam findings, imaging, and labs as documented above. CT showing  Small bowel dilatation with apparent transition point in the left quadrant.       -NPO  -IVF  -Monitor bowel function  -Monitor vitals   -DVT: Enoxaparin 40mg QD  -Zofran 4mg every 8 hours PRN for nausea  -Low threshold for NGT placement if nausea/emesis occurs     ACS Attending Attestation:  Delayed entry. Patient seen on 10/3/23 at 9:00 AM. Patient reported abdominal pain improved. No nausea or vomiting. Hungry. Passing flatus. Had liquid BM this morning. Will get contrast challenge and obstruction series due to bowel wall edema seen on CT scan. Will plan for clears if contrast is seen in the colon and advance as tolerated.    Rob Rosas MD

## 2023-10-03 NOTE — PATIENT PROFILE ADULT - FUNCTIONAL ASSESSMENT - BASIC MOBILITY 6.
3-calculated by average/Not able to assess (calculate score using [facility] averaging method)

## 2023-10-04 LAB
ANION GAP SERPL CALC-SCNC: 10 MMOL/L — SIGNIFICANT CHANGE UP (ref 7–14)
BASOPHILS # BLD AUTO: 0.02 K/UL — SIGNIFICANT CHANGE UP (ref 0–0.2)
BASOPHILS NFR BLD AUTO: 0.4 % — SIGNIFICANT CHANGE UP (ref 0–1)
BUN SERPL-MCNC: 4 MG/DL — LOW (ref 10–20)
CALCIUM SERPL-MCNC: 8.2 MG/DL — LOW (ref 8.4–10.5)
CHLORIDE SERPL-SCNC: 105 MMOL/L — SIGNIFICANT CHANGE UP (ref 98–110)
CO2 SERPL-SCNC: 26 MMOL/L — SIGNIFICANT CHANGE UP (ref 17–32)
CREAT SERPL-MCNC: 0.6 MG/DL — LOW (ref 0.7–1.5)
CULTURE RESULTS: SIGNIFICANT CHANGE UP
EGFR: 92 ML/MIN/1.73M2 — SIGNIFICANT CHANGE UP
EOSINOPHIL # BLD AUTO: 0.18 K/UL — SIGNIFICANT CHANGE UP (ref 0–0.7)
EOSINOPHIL NFR BLD AUTO: 3.9 % — SIGNIFICANT CHANGE UP (ref 0–8)
GLUCOSE SERPL-MCNC: 103 MG/DL — HIGH (ref 70–99)
HCT VFR BLD CALC: 37.2 % — SIGNIFICANT CHANGE UP (ref 37–47)
HGB BLD-MCNC: 12.1 G/DL — SIGNIFICANT CHANGE UP (ref 12–16)
IMM GRANULOCYTES NFR BLD AUTO: 0.2 % — SIGNIFICANT CHANGE UP (ref 0.1–0.3)
LYMPHOCYTES # BLD AUTO: 0.86 K/UL — LOW (ref 1.2–3.4)
LYMPHOCYTES # BLD AUTO: 18.7 % — LOW (ref 20.5–51.1)
MAGNESIUM SERPL-MCNC: 2.1 MG/DL — SIGNIFICANT CHANGE UP (ref 1.8–2.4)
MCHC RBC-ENTMCNC: 27.4 PG — SIGNIFICANT CHANGE UP (ref 27–31)
MCHC RBC-ENTMCNC: 32.5 G/DL — SIGNIFICANT CHANGE UP (ref 32–37)
MCV RBC AUTO: 84.2 FL — SIGNIFICANT CHANGE UP (ref 81–99)
MONOCYTES # BLD AUTO: 0.7 K/UL — HIGH (ref 0.1–0.6)
MONOCYTES NFR BLD AUTO: 15.2 % — HIGH (ref 1.7–9.3)
NEUTROPHILS # BLD AUTO: 2.83 K/UL — SIGNIFICANT CHANGE UP (ref 1.4–6.5)
NEUTROPHILS NFR BLD AUTO: 61.6 % — SIGNIFICANT CHANGE UP (ref 42.2–75.2)
NRBC # BLD: 0 /100 WBCS — SIGNIFICANT CHANGE UP (ref 0–0)
PHOSPHATE SERPL-MCNC: 2.2 MG/DL — SIGNIFICANT CHANGE UP (ref 2.1–4.9)
PLATELET # BLD AUTO: 203 K/UL — SIGNIFICANT CHANGE UP (ref 130–400)
PMV BLD: 11.4 FL — HIGH (ref 7.4–10.4)
POTASSIUM SERPL-MCNC: 4.5 MMOL/L — SIGNIFICANT CHANGE UP (ref 3.5–5)
POTASSIUM SERPL-SCNC: 4.5 MMOL/L — SIGNIFICANT CHANGE UP (ref 3.5–5)
RBC # BLD: 4.42 M/UL — SIGNIFICANT CHANGE UP (ref 4.2–5.4)
RBC # FLD: 13.8 % — SIGNIFICANT CHANGE UP (ref 11.5–14.5)
SODIUM SERPL-SCNC: 141 MMOL/L — SIGNIFICANT CHANGE UP (ref 135–146)
SPECIMEN SOURCE: SIGNIFICANT CHANGE UP
WBC # BLD: 4.6 K/UL — LOW (ref 4.8–10.8)
WBC # FLD AUTO: 4.6 K/UL — LOW (ref 4.8–10.8)

## 2023-10-04 PROCEDURE — 99232 SBSQ HOSP IP/OBS MODERATE 35: CPT

## 2023-10-04 RX ORDER — SODIUM,POTASSIUM PHOSPHATES 278-250MG
1 POWDER IN PACKET (EA) ORAL ONCE
Refills: 0 | Status: COMPLETED | OUTPATIENT
Start: 2023-10-04 | End: 2023-10-04

## 2023-10-04 RX ORDER — SODIUM CHLORIDE 9 MG/ML
1000 INJECTION, SOLUTION INTRAVENOUS
Refills: 0 | Status: DISCONTINUED | OUTPATIENT
Start: 2023-10-04 | End: 2023-10-05

## 2023-10-04 RX ADMIN — Medication 1 PACKET(S): at 01:09

## 2023-10-04 RX ADMIN — CEFTRIAXONE 100 MILLIGRAM(S): 500 INJECTION, POWDER, FOR SOLUTION INTRAMUSCULAR; INTRAVENOUS at 11:35

## 2023-10-04 NOTE — PROGRESS NOTE ADULT - SUBJECTIVE AND OBJECTIVE BOX
GENERAL SURGERY PROGRESS NOTE     MANN ADAMS  78y  Female  Hospital day :3d    OVERNIGHT EVENTS:  - No nausea or vomiting  - On a CLD, tolerating  - BM+(liquid)  - No abdominal pain  - K and Phos repleted    T(F): 98.6 (10-04-23 @ 00:13), Max: 98.6 (10-04-23 @ 00:13)  HR: 87 (10-04-23 @ 00:13) (78 - 90)  BP: 130/65 (10-04-23 @ 00:13) (101/56 - 158/72)  RR: 18 (10-04-23 @ 00:13) (18 - 18)  SpO2: 99% (10-04-23 @ 00:13) (94% - 99%)    DIET/FLUIDS: lactated ringers. 1000 milliLiter(s) IV Continuous <Continuous>  potassium phosphate / sodium phosphate Powder (PHOS-NaK) 1 Packet(s) Oral once     AC/ proph: enoxaparin Injectable 40 milliGRAM(s) SubCutaneous every 24 hours  ABx: cefTRIAXone   IVPB      cefTRIAXone   IVPB 1000 milliGRAM(s) IV Intermittent every 24 hours      PHYSICAL EXAM:  GENERAL: NAD  CHEST/LUNG: Clear to auscultation bilaterally  HEART: Regular rate and rhythm  ABDOMEN: Soft, Nontender, Nondistended;       LABS  Labs:  CAPILLARY BLOOD GLUCOSE                              11.9   4.78  )-----------( 207      ( 03 Oct 2023 21:51 )             38.5       Auto Neutrophil %: 64.6 % (10-03-23 @ 21:51)  Auto Immature Granulocyte %: 0.2 % (10-03-23 @ 21:51)    10-03    141  |  105  |  15  ----------------------------<  80  3.8   |  25  |  0.6<L>      Calcium: 7.9 mg/dL (10-03-23 @ 21:51)      LFTs:             8.8  | 0.4  | 22       ------------------[127     ( 02 Oct 2023 09:19 )  5.3  | x    | 22          Lipase:80     Amylase:x         Lactate, Blood: 1.4 mmol/L (10-02-23 @ 09:19)      Coags:    CARDIAC MARKERS ( 02 Oct 2023 09:19 )  x     / <0.01 ng/mL / x     / x     / x              Urinalysis Basic - ( 03 Oct 2023 21:51 )    Color: x / Appearance: x / SG: x / pH: x  Gluc: 80 mg/dL / Ketone: x  / Bili: x / Urobili: x   Blood: x / Protein: x / Nitrite: x   Leuk Esterase: x / RBC: x / WBC x   Sq Epi: x / Non Sq Epi: x / Bacteria: x            RADIOLOGY & ADDITIONAL TESTS:  < from: Xray Abdomen Minimum 3 Views (10.03.23 @ 14:53) >    Findings/  impression:  Persistent dilated loops of small bowel. Contrast has progressed into the   colon suggesting that this may reflect an early or partial small bowel   obstruction. Degenerative changes.

## 2023-10-04 NOTE — PROVIDER CONTACT NOTE (OTHER) - SITUATION
patient reports having 5 loose bowel movements during the day yesterday, overnight patient had another 4.

## 2023-10-04 NOTE — PROGRESS NOTE ADULT - ATTENDING COMMENTS
ACS Attending  Note Attestation    Patient is examined and evaluated at the bedside with the residents/PAs. Treatment plan discussed with the team, nurses, and consulting physicians and consulting teams. Medications, radiological studies and all other relevant studies reviewed.     MANN ADAMS Patient is a 78y old  Female who presents with a chief complaint of SBO resolving BM+, Flatus +      Vital Signs Last 24 Hrs  T(C): 37.4 (04 Oct 2023 17:16), Max: 37.4 (04 Oct 2023 17:16)  T(F): 99.3 (04 Oct 2023 17:16), Max: 99.3 (04 Oct 2023 17:16)  HR: 84 (04 Oct 2023 17:16) (82 - 90)  BP: 155/76 (04 Oct 2023 17:16) (130/65 - 160/56)  BP(mean): --  RR: 18 (04 Oct 2023 17:16) (18 - 18)  SpO2: 97% (04 Oct 2023 17:16) (95% - 99%)    Parameters below as of 04 Oct 2023 17:16  Patient On (Oxygen Delivery Method): room air                            11.9   4.78  )-----------( 207      ( 03 Oct 2023 21:51 )             38.5     10-03    141  |  105  |  15  ----------------------------<  80  3.8   |  25  |  0.6<L>    Ca    7.9<L>      03 Oct 2023 21:51  Phos  2.2     10-03  Mg     2.4     10-03        Diagnosis: SBO    Plan:	  - advance diet as tolerated  - supportive care  - GI/DVT prophylaxis  - pain management  - incentive spirometer    - follow up consults  - repeat studies as needed  - replace electrolytes  - case management evaluation     Rach Moise MD, FACS  Trauma/ACS/Surgical Critical Care Attending

## 2023-10-04 NOTE — PROGRESS NOTE ADULT - ASSESSMENT
ASSESSMENT:  79 yo female with a medical history of asthma, hyperlipidemia, s/p bilateral salpingectomy (40 years ago)  who presented with abdominal pain and nasuesas. Physical exam findings, imaging, and labs as documented above. CT showing  Small bowel dilatation with apparent transition point in the left quadrant.    PLAN:  - Hemodynamic monitoring  - Monitor bowel function  - Monitor for nausea, vomiting, and abdominal pain  - Adequate pain control  - Replete electrolytes as needed  - On CLD, tolerating -> advance diet as tolerated  - Anticipated discharge today    [phone]

## 2023-10-05 ENCOUNTER — TRANSCRIPTION ENCOUNTER (OUTPATIENT)
Age: 78
End: 2023-10-05

## 2023-10-05 VITALS
HEART RATE: 94 BPM | DIASTOLIC BLOOD PRESSURE: 77 MMHG | TEMPERATURE: 98 F | OXYGEN SATURATION: 96 % | RESPIRATION RATE: 18 BRPM | SYSTOLIC BLOOD PRESSURE: 150 MMHG

## 2023-10-05 PROCEDURE — 99232 SBSQ HOSP IP/OBS MODERATE 35: CPT | Mod: 25,24

## 2023-10-05 RX ADMIN — Medication 85 MILLIMOLE(S): at 06:12

## 2023-10-05 RX ADMIN — ENOXAPARIN SODIUM 40 MILLIGRAM(S): 100 INJECTION SUBCUTANEOUS at 00:24

## 2023-10-05 NOTE — PROGRESS NOTE ADULT - ATTENDING COMMENTS
ACS Attending  Note Attestation    Patient is examined and evaluated at the bedside with the residents/PAs. Treatment plan discussed with the team, nurses, and consulting physicians and consulting teams. Medications, radiological studies and all other relevant studies reviewed.     MANN ADAMS Patient is a 78y old  Female who presents with a chief complaint of SBO resolving BM+, Flatus +    Vital Signs Last 24 Hrs  T(C): 36.9 (05 Oct 2023 13:00), Max: 37.4 (04 Oct 2023 17:16)  T(F): 98.4 (05 Oct 2023 13:00), Max: 99.3 (04 Oct 2023 17:16)  HR: 94 (05 Oct 2023 13:00) (78 - 94)  BP: 150/77 (05 Oct 2023 13:00) (135/65 - 155/76)  BP(mean): --  RR: 18 (05 Oct 2023 13:00) (18 - 18)  SpO2: 96% (05 Oct 2023 13:00) (93% - 97%)    Parameters below as of 05 Oct 2023 13:00  Patient On (Oxygen Delivery Method): room air                            12.1   4.60  )-----------( 203      ( 04 Oct 2023 22:16 )             37.2   10-04    141  |  105  |  4<L>  ----------------------------<  103<H>  4.5   |  26  |  0.6<L>    Ca    8.2<L>      04 Oct 2023 22:16  Phos  2.2     10-04  Mg     2.1     10-04      Diagnosis: SBO    Plan:	  - advance diet as tolerated  - supportive care  - GI/DVT prophylaxis  - pain management  - incentive spirometer    - follow up consults  - repeat studies as needed  - replace electrolytes  - case management evaluation     Rach Moise MD, FACS  Trauma/ACS/Surgical Critical Care Attending

## 2023-10-05 NOTE — DISCHARGE NOTE NURSING/CASE MANAGEMENT/SOCIAL WORK - PATIENT PORTAL LINK FT
You can access the FollowMyHealth Patient Portal offered by [facility] by registering at the following website: [email or web address]. By joining [employer]’s FollowMyHealth portal, you will also be able to view your health information using other applications (apps) compatible with our system.

## 2023-10-05 NOTE — DISCHARGE NOTE NURSING/CASE MANAGEMENT/SOCIAL WORK - NSDCPEFALRISK_GEN_ALL_CORE
For information on Fall & Injury Prevention, visit: [email or web address] OR  [email or web address] OR  https://www.cdc.gov/steadi/patient.html

## 2023-10-05 NOTE — PROGRESS NOTE ADULT - SUBJECTIVE AND OBJECTIVE BOX
GENERAL SURGERY PROGRESS NOTE     MANN ADAMS  78y  Female  Hospital day :3d     OVERNIGHT EVENTS: No acute events overnight. Patient HDS, afebrile and saturating appropriately. Patients abdomen NTND, tolerating FLD and denies any N/V. Patient doing well clinically.     T(F): 98.8 (10-05-23 @ 00:00), Max: 99.3 (10-04-23 @ 17:16)  HR: 86 (10-05-23 @ 00:00) (82 - 86)  BP: 143/74 (10-05-23 @ 00:00) (135/64 - 160/56)  ABP: --  ABP(mean): --  RR: 18 (10-05-23 @ 00:00) (18 - 18)  SpO2: 97% (10-05-23 @ 00:00) (95% - 98%)    DIET/FLUIDS: lactated ringers. 1000 milliLiter(s) IV Continuous <Continuous>        GI proph:    AC/ proph: enoxaparin Injectable 40 milliGRAM(s) SubCutaneous every 24 hours    ABx: cefTRIAXone   IVPB      cefTRIAXone   IVPB 1000 milliGRAM(s) IV Intermittent every 24 hours      PHYSICAL EXAM:  GENERAL: NAD   ABDOMEN: Soft, Nontender, Nondistended;   EXTREMITIES:  No clubbing, cyanosis, or edema      LABS  Labs:  CAPILLARY BLOOD GLUCOSE                              12.1   4.60  )-----------( 203      ( 04 Oct 2023 22:16 )             37.2       Auto Neutrophil %: 61.6 % (10-04-23 @ 22:16)  Auto Immature Granulocyte %: 0.2 % (10-04-23 @ 22:16)    10-04    141  |  105  |  4<L>  ----------------------------<  103<H>  4.5   |  26  |  0.6<L>      Calcium: 8.2 mg/dL (10-04-23 @ 22:16)      LFTs:     Lactate, Blood: 1.4 mmol/L (10-02-23 @ 09:19)      Coags:            Urinalysis Basic - ( 04 Oct 2023 22:16 )    Color: x / Appearance: x / SG: x / pH: x  Gluc: 103 mg/dL / Ketone: x  / Bili: x / Urobili: x   Blood: x / Protein: x / Nitrite: x   Leuk Esterase: x / RBC: x / WBC x   Sq Epi: x / Non Sq Epi: x / Bacteria: x        Culture - Urine (collected 02 Oct 2023 10:00)  Source: Clean Catch Clean Catch (Midstream)  Final Report (04 Oct 2023 14:46):    <10,000 CFU/mL Normal Urogenital Jessica          RADIOLOGY & ADDITIONAL TESTS:      A/P    79 yo female with a medical history of asthma, hyperlipidemia, s/p bilateral salpingectomy (40 years ago)  who presented with abdominal pain and nasuesas. Physical exam findings, imaging, and labs as documented above. CT showing  Small bowel dilatation with apparent transition point in the left quadrant.    PLAN:  - Hemodynamic monitoring  - Monitor bowel function  - Monitor for nausea, vomiting, and abdominal pain  - Adequate pain control  - Replete electrolytes as needed  - On FLD, tolerating -> advance diet as tolerated  - Anticipated discharge 10/5

## 2023-10-05 NOTE — DISCHARGE NOTE PROVIDER - HOSPITAL COURSE
77 yo female with a medical history of asthma, hyperlipidemia, s/p bilateral salpingectomy (40 years ago)  presents to the ED complaining of abdominal pain for the past 2 days, associated with multiple episodes of bilious vomiting x 2 days. No fevers, diarrhea, chills, SOB, or chest pain. Last bowel movement was the day prior to admission overnight and did pass gas on the morning of arrival. Patient has never had any episodes like this before. CT scan in ED showed small bowel dilatation with apparent transition point in the left quadrant.   CT A/P: IMPRESSION:  Small bowel obstruction as described.  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   Recommend surgical follow-up.  Pulmonary nodules measuring up to 6 mm. Recommend chest CT follow-up in   3-6 months or earlier as clinically warranted.    The patient was admitted under the surgical service, and placed NPO and given IVF.   On 10/3, she started passing gas. Obstructive series ordered with oral contrast, which showed contrast had progressed into the colon and there were still dilated loops of small bowel.  The patient's diet was slowly advanced to CLD, then fulls.   On 10/5, the patient was ready for discharge. She was tolerating her diet without any N/V. Passing gas, had BM. No abdominal pain or nausea. Making urine, VSS, and labs stable as well.

## 2023-10-05 NOTE — DISCHARGE NOTE PROVIDER - NSDCFUADDINST_GEN_ALL_CORE_FT
Follow up with your primary medical doctor in the next 2 weeks. There were lung nodules seen on your CT scan of your chest. Per guidelines from radiology, they recommend you have a repeat CT scan in 3-6 months to monitor those nodules.

## 2023-10-05 NOTE — DISCHARGE NOTE PROVIDER - CARE PROVIDER_API CALL
Rob Rosas [facility]  Surgery  [phone] [place], [place] [address]  Phone: [phone]  Fax: [phone]  Established Patient  Follow Up Time: 2 weeks

## 2023-10-05 NOTE — DISCHARGE NOTE PROVIDER - NSDCMRMEDTOKEN_GEN_ALL_CORE_FT
Albuterol (Eqv-ProAir HFA) 90 mcg/inh inhalation aerosol: 2 puff(s) inhaled 3 times a day as needed for  bronchospasm  atorvastatin 40 mg oral tablet: 1 tab(s) orally once a day

## 2023-10-11 DIAGNOSIS — J45.909 UNSPECIFIED ASTHMA, UNCOMPLICATED: ICD-10-CM

## 2023-10-11 DIAGNOSIS — R11.10 VOMITING, UNSPECIFIED: ICD-10-CM

## 2023-10-11 DIAGNOSIS — R91.8 OTHER NONSPECIFIC ABNORMAL FINDING OF LUNG FIELD: ICD-10-CM

## 2023-10-11 DIAGNOSIS — K56.609 UNSPECIFIED INTESTINAL OBSTRUCTION, UNSPECIFIED AS TO PARTIAL VERSUS COMPLETE OBSTRUCTION: ICD-10-CM

## 2023-10-11 DIAGNOSIS — E78.5 HYPERLIPIDEMIA, UNSPECIFIED: ICD-10-CM

## 2023-11-24 ENCOUNTER — OUTPATIENT (OUTPATIENT)
Dept: OUTPATIENT SERVICES | Facility: HOSPITAL | Age: 78
LOS: 1 days | End: 2023-11-24
Payer: MEDICARE

## 2023-11-24 VITALS
TEMPERATURE: 98 F | DIASTOLIC BLOOD PRESSURE: 76 MMHG | SYSTOLIC BLOOD PRESSURE: 139 MMHG | HEIGHT: 64 IN | OXYGEN SATURATION: 96 % | WEIGHT: 136.03 LBS | HEART RATE: 86 BPM | RESPIRATION RATE: 16 BRPM

## 2023-11-24 DIAGNOSIS — K80.20 CALCULUS OF GALLBLADDER WITHOUT CHOLECYSTITIS WITHOUT OBSTRUCTION: ICD-10-CM

## 2023-11-24 DIAGNOSIS — Z98.890 OTHER SPECIFIED POSTPROCEDURAL STATES: Chronic | ICD-10-CM

## 2023-11-24 DIAGNOSIS — Z01.818 ENCOUNTER FOR OTHER PREPROCEDURAL EXAMINATION: ICD-10-CM

## 2023-11-24 LAB
ALBUMIN SERPL ELPH-MCNC: 4.6 G/DL — SIGNIFICANT CHANGE UP (ref 3.5–5.2)
ALBUMIN SERPL ELPH-MCNC: 4.6 G/DL — SIGNIFICANT CHANGE UP (ref 3.5–5.2)
ALP SERPL-CCNC: 85 U/L — SIGNIFICANT CHANGE UP (ref 30–115)
ALP SERPL-CCNC: 85 U/L — SIGNIFICANT CHANGE UP (ref 30–115)
ALT FLD-CCNC: 16 U/L — SIGNIFICANT CHANGE UP (ref 0–41)
ALT FLD-CCNC: 16 U/L — SIGNIFICANT CHANGE UP (ref 0–41)
ANION GAP SERPL CALC-SCNC: 10 MMOL/L — SIGNIFICANT CHANGE UP (ref 7–14)
ANION GAP SERPL CALC-SCNC: 10 MMOL/L — SIGNIFICANT CHANGE UP (ref 7–14)
APTT BLD: 35.1 SEC — SIGNIFICANT CHANGE UP (ref 27–39.2)
APTT BLD: 35.1 SEC — SIGNIFICANT CHANGE UP (ref 27–39.2)
AST SERPL-CCNC: 17 U/L — SIGNIFICANT CHANGE UP (ref 0–41)
AST SERPL-CCNC: 17 U/L — SIGNIFICANT CHANGE UP (ref 0–41)
BASOPHILS # BLD AUTO: 0.02 K/UL — SIGNIFICANT CHANGE UP (ref 0–0.2)
BASOPHILS # BLD AUTO: 0.02 K/UL — SIGNIFICANT CHANGE UP (ref 0–0.2)
BASOPHILS NFR BLD AUTO: 0.4 % — SIGNIFICANT CHANGE UP (ref 0–1)
BASOPHILS NFR BLD AUTO: 0.4 % — SIGNIFICANT CHANGE UP (ref 0–1)
BILIRUB SERPL-MCNC: 0.5 MG/DL — SIGNIFICANT CHANGE UP (ref 0.2–1.2)
BILIRUB SERPL-MCNC: 0.5 MG/DL — SIGNIFICANT CHANGE UP (ref 0.2–1.2)
BUN SERPL-MCNC: 13 MG/DL — SIGNIFICANT CHANGE UP (ref 10–20)
BUN SERPL-MCNC: 13 MG/DL — SIGNIFICANT CHANGE UP (ref 10–20)
CALCIUM SERPL-MCNC: 9.5 MG/DL — SIGNIFICANT CHANGE UP (ref 8.4–10.5)
CALCIUM SERPL-MCNC: 9.5 MG/DL — SIGNIFICANT CHANGE UP (ref 8.4–10.5)
CHLORIDE SERPL-SCNC: 102 MMOL/L — SIGNIFICANT CHANGE UP (ref 98–110)
CHLORIDE SERPL-SCNC: 102 MMOL/L — SIGNIFICANT CHANGE UP (ref 98–110)
CO2 SERPL-SCNC: 27 MMOL/L — SIGNIFICANT CHANGE UP (ref 17–32)
CO2 SERPL-SCNC: 27 MMOL/L — SIGNIFICANT CHANGE UP (ref 17–32)
CREAT SERPL-MCNC: 0.7 MG/DL — SIGNIFICANT CHANGE UP (ref 0.7–1.5)
CREAT SERPL-MCNC: 0.7 MG/DL — SIGNIFICANT CHANGE UP (ref 0.7–1.5)
EGFR: 89 ML/MIN/1.73M2 — SIGNIFICANT CHANGE UP
EGFR: 89 ML/MIN/1.73M2 — SIGNIFICANT CHANGE UP
EOSINOPHIL # BLD AUTO: 0.29 K/UL — SIGNIFICANT CHANGE UP (ref 0–0.7)
EOSINOPHIL # BLD AUTO: 0.29 K/UL — SIGNIFICANT CHANGE UP (ref 0–0.7)
EOSINOPHIL NFR BLD AUTO: 6.2 % — SIGNIFICANT CHANGE UP (ref 0–8)
EOSINOPHIL NFR BLD AUTO: 6.2 % — SIGNIFICANT CHANGE UP (ref 0–8)
GLUCOSE SERPL-MCNC: 95 MG/DL — SIGNIFICANT CHANGE UP (ref 70–99)
GLUCOSE SERPL-MCNC: 95 MG/DL — SIGNIFICANT CHANGE UP (ref 70–99)
HCT VFR BLD CALC: 39.8 % — SIGNIFICANT CHANGE UP (ref 37–47)
HCT VFR BLD CALC: 39.8 % — SIGNIFICANT CHANGE UP (ref 37–47)
HGB BLD-MCNC: 12.8 G/DL — SIGNIFICANT CHANGE UP (ref 12–16)
HGB BLD-MCNC: 12.8 G/DL — SIGNIFICANT CHANGE UP (ref 12–16)
IMM GRANULOCYTES NFR BLD AUTO: 0.2 % — SIGNIFICANT CHANGE UP (ref 0.1–0.3)
IMM GRANULOCYTES NFR BLD AUTO: 0.2 % — SIGNIFICANT CHANGE UP (ref 0.1–0.3)
INR BLD: 1.1 RATIO — SIGNIFICANT CHANGE UP (ref 0.65–1.3)
INR BLD: 1.1 RATIO — SIGNIFICANT CHANGE UP (ref 0.65–1.3)
LYMPHOCYTES # BLD AUTO: 1.19 K/UL — LOW (ref 1.2–3.4)
LYMPHOCYTES # BLD AUTO: 1.19 K/UL — LOW (ref 1.2–3.4)
LYMPHOCYTES # BLD AUTO: 25.4 % — SIGNIFICANT CHANGE UP (ref 20.5–51.1)
LYMPHOCYTES # BLD AUTO: 25.4 % — SIGNIFICANT CHANGE UP (ref 20.5–51.1)
MCHC RBC-ENTMCNC: 27.2 PG — SIGNIFICANT CHANGE UP (ref 27–31)
MCHC RBC-ENTMCNC: 27.2 PG — SIGNIFICANT CHANGE UP (ref 27–31)
MCHC RBC-ENTMCNC: 32.2 G/DL — SIGNIFICANT CHANGE UP (ref 32–37)
MCHC RBC-ENTMCNC: 32.2 G/DL — SIGNIFICANT CHANGE UP (ref 32–37)
MCV RBC AUTO: 84.5 FL — SIGNIFICANT CHANGE UP (ref 81–99)
MCV RBC AUTO: 84.5 FL — SIGNIFICANT CHANGE UP (ref 81–99)
MONOCYTES # BLD AUTO: 0.43 K/UL — SIGNIFICANT CHANGE UP (ref 0.1–0.6)
MONOCYTES # BLD AUTO: 0.43 K/UL — SIGNIFICANT CHANGE UP (ref 0.1–0.6)
MONOCYTES NFR BLD AUTO: 9.2 % — SIGNIFICANT CHANGE UP (ref 1.7–9.3)
MONOCYTES NFR BLD AUTO: 9.2 % — SIGNIFICANT CHANGE UP (ref 1.7–9.3)
NEUTROPHILS # BLD AUTO: 2.74 K/UL — SIGNIFICANT CHANGE UP (ref 1.4–6.5)
NEUTROPHILS # BLD AUTO: 2.74 K/UL — SIGNIFICANT CHANGE UP (ref 1.4–6.5)
NEUTROPHILS NFR BLD AUTO: 58.6 % — SIGNIFICANT CHANGE UP (ref 42.2–75.2)
NEUTROPHILS NFR BLD AUTO: 58.6 % — SIGNIFICANT CHANGE UP (ref 42.2–75.2)
NRBC # BLD: 0 /100 WBCS — SIGNIFICANT CHANGE UP (ref 0–0)
NRBC # BLD: 0 /100 WBCS — SIGNIFICANT CHANGE UP (ref 0–0)
PLATELET # BLD AUTO: 293 K/UL — SIGNIFICANT CHANGE UP (ref 130–400)
PLATELET # BLD AUTO: 293 K/UL — SIGNIFICANT CHANGE UP (ref 130–400)
PMV BLD: 11 FL — HIGH (ref 7.4–10.4)
PMV BLD: 11 FL — HIGH (ref 7.4–10.4)
POTASSIUM SERPL-MCNC: 4.9 MMOL/L — SIGNIFICANT CHANGE UP (ref 3.5–5)
POTASSIUM SERPL-MCNC: 4.9 MMOL/L — SIGNIFICANT CHANGE UP (ref 3.5–5)
POTASSIUM SERPL-SCNC: 4.9 MMOL/L — SIGNIFICANT CHANGE UP (ref 3.5–5)
POTASSIUM SERPL-SCNC: 4.9 MMOL/L — SIGNIFICANT CHANGE UP (ref 3.5–5)
PROT SERPL-MCNC: 7.3 G/DL — SIGNIFICANT CHANGE UP (ref 6–8)
PROT SERPL-MCNC: 7.3 G/DL — SIGNIFICANT CHANGE UP (ref 6–8)
PROTHROM AB SERPL-ACNC: 12.5 SEC — SIGNIFICANT CHANGE UP (ref 9.95–12.87)
PROTHROM AB SERPL-ACNC: 12.5 SEC — SIGNIFICANT CHANGE UP (ref 9.95–12.87)
RBC # BLD: 4.71 M/UL — SIGNIFICANT CHANGE UP (ref 4.2–5.4)
RBC # BLD: 4.71 M/UL — SIGNIFICANT CHANGE UP (ref 4.2–5.4)
RBC # FLD: 13.3 % — SIGNIFICANT CHANGE UP (ref 11.5–14.5)
RBC # FLD: 13.3 % — SIGNIFICANT CHANGE UP (ref 11.5–14.5)
SODIUM SERPL-SCNC: 139 MMOL/L — SIGNIFICANT CHANGE UP (ref 135–146)
SODIUM SERPL-SCNC: 139 MMOL/L — SIGNIFICANT CHANGE UP (ref 135–146)
WBC # BLD: 4.68 K/UL — LOW (ref 4.8–10.8)
WBC # BLD: 4.68 K/UL — LOW (ref 4.8–10.8)
WBC # FLD AUTO: 4.68 K/UL — LOW (ref 4.8–10.8)
WBC # FLD AUTO: 4.68 K/UL — LOW (ref 4.8–10.8)

## 2023-11-24 PROCEDURE — 85025 COMPLETE CBC W/AUTO DIFF WBC: CPT

## 2023-11-24 PROCEDURE — 99214 OFFICE O/P EST MOD 30 MIN: CPT | Mod: 25

## 2023-11-24 PROCEDURE — 85610 PROTHROMBIN TIME: CPT

## 2023-11-24 PROCEDURE — 80053 COMPREHEN METABOLIC PANEL: CPT

## 2023-11-24 PROCEDURE — 85730 THROMBOPLASTIN TIME PARTIAL: CPT

## 2023-11-24 PROCEDURE — 36415 COLL VENOUS BLD VENIPUNCTURE: CPT

## 2023-11-24 RX ORDER — ALBUTEROL 90 UG/1
2 AEROSOL, METERED ORAL
Refills: 0 | DISCHARGE

## 2023-11-24 RX ORDER — BUDESONIDE, MICRONIZED 100 %
1 POWDER (GRAM) MISCELLANEOUS
Refills: 0 | DISCHARGE

## 2023-11-24 RX ORDER — ATORVASTATIN CALCIUM 80 MG/1
1 TABLET, FILM COATED ORAL
Refills: 0 | DISCHARGE

## 2023-11-24 RX ORDER — ALENDRONATE SODIUM 70 MG/1
1 TABLET ORAL
Refills: 0 | DISCHARGE

## 2023-11-24 RX ORDER — MECLIZINE HCL 12.5 MG
1 TABLET ORAL
Refills: 0 | DISCHARGE

## 2023-11-24 NOTE — H&P PST ADULT - REASON FOR ADMISSION
77 yo female presents for PAST in preparation for laparoscopic cholecystectomy possible open on 12/1/2023 under general anesthesia by Dr. KENNY Holman ([facility]).

## 2023-11-24 NOTE — H&P PST ADULT - NSICDXPASTMEDICALHX_GEN_ALL_CORE_FT
PAST MEDICAL HISTORY:  Asthma     History of vertigo     Legally blind     JAMI (obstructive sleep apnea)     Osteoporosis

## 2023-11-24 NOTE — H&P PST ADULT - HISTORY OF PRESENT ILLNESS
Pt is legally blind and only Kinyarwanda speaking presents with son, Ravindra Gonzales. She prefers he translate. Per Ravindra, he states his mother was admitted in the hospital in October with N&V and RUQ pain. Did imaging which revealed calcifications within the gallbladder Has not had any pain/discomfort since then. Advised to proceed with above.    Denies any chest pain, difficulty breathing, SOB, palpitations, dysuria, URI, or any other infections in the last 2 weeks. Denies any recent travel, contact, or exposure to any persons with known or suspected COVID-19. Denies any suicidal or homicidal ideations. JAMI reviewed with patient.     Endorses this is their full medical & surgical history including medications prescribed. Pt verbalized understanding of all pre-operative instructions and was given the opportunity to ask questions and have them answered. They were instructed to follow up with their surgeon/proceduralists office with any additional questions regarding their procedure.    Anesthesia Alert  NO--Difficult Airway  NO--History of neck surgery or radiation  NO--Limited ROM of neck  NO--History of Malignant hyperthermia  NO--No personal or family history of Pseudocholinesterase deficiency.  NO--Prior Anesthesia Complication  NO--Latex Allergy  NO--Loose teeth  NO--History of Rheumatoid Arthritis  YES--JAMI  NO--Bleeding Risk  YES--Other: Legally Blind    Revised Cardiac Risk Index for Pre-Operative Risk  RESULT SUMMARY:  0 points  Class I Risk    3.9 %  30-day risk of death, MI, or cardiac arrest    From Duceppe 2017. These numbers are higher than those from the original study (Isael 1999). See Evidence for details.    INPUTS:  Elevated-risk surgery —> 0 = No  History of ischemic heart disease —> 0 = No  History of congestive heart failure —> 0 = No  History of cerebrovascular disease —> 0 = No  Pre-operative treatment with insulin —> 0 = No  Pre-operative creatinine >2 mg/dL / 176.8 µmol/L —> 0 = No    *DASI score not calculated as pt is blind. She is unable to complete any task without assistance d/t vision not cardiopulmonary status*

## 2023-11-25 DIAGNOSIS — Z01.818 ENCOUNTER FOR OTHER PREPROCEDURAL EXAMINATION: ICD-10-CM

## 2023-11-25 DIAGNOSIS — K80.20 CALCULUS OF GALLBLADDER WITHOUT CHOLECYSTITIS WITHOUT OBSTRUCTION: ICD-10-CM

## 2023-11-30 NOTE — ASU PATIENT PROFILE, ADULT - FALL HARM RISK - RISK INTERVENTIONS
Assistance OOB with selected safe patient handling equipment/Communicate Fall Risk and Risk Factors to all staff, patient, and family/Discuss with provider need for PT consult/Monitor gait and stability/Move patient closer to nurses' station/Provide patient with walking aids - walker, cane, crutches/Reinforce activity limits and safety measures with patient and family/Visual Cue: Yellow wristband/Bed in lowest position, wheels locked, appropriate side rails in place/Call bell, personal items and telephone in reach/Instruct patient to call for assistance before getting out of bed or chair/Non-slip footwear when patient is out of bed/[place] to call system/Physically safe environment - no spills, clutter or unnecessary equipment/Purposeful Proactive Rounding/Room/bathroom lighting operational, light cord in reach

## 2023-12-01 ENCOUNTER — TRANSCRIPTION ENCOUNTER (OUTPATIENT)
Age: 78
End: 2023-12-01

## 2023-12-01 ENCOUNTER — OUTPATIENT (OUTPATIENT)
Dept: OUTPATIENT SERVICES | Facility: HOSPITAL | Age: 78
LOS: 1 days | Discharge: HOME | End: 2023-12-01
Payer: MEDICAID

## 2023-12-01 VITALS
TEMPERATURE: 99 F | SYSTOLIC BLOOD PRESSURE: 187 MMHG | OXYGEN SATURATION: 99 % | HEIGHT: 64 IN | HEART RATE: 94 BPM | WEIGHT: 136.03 LBS | DIASTOLIC BLOOD PRESSURE: 84 MMHG | RESPIRATION RATE: 16 BRPM

## 2023-12-01 VITALS
SYSTOLIC BLOOD PRESSURE: 176 MMHG | HEART RATE: 78 BPM | RESPIRATION RATE: 16 BRPM | OXYGEN SATURATION: 98 % | DIASTOLIC BLOOD PRESSURE: 74 MMHG

## 2023-12-01 DIAGNOSIS — G47.33 OBSTRUCTIVE SLEEP APNEA (ADULT) (PEDIATRIC): ICD-10-CM

## 2023-12-01 DIAGNOSIS — K82.8 OTHER SPECIFIED DISEASES OF GALLBLADDER: ICD-10-CM

## 2023-12-01 DIAGNOSIS — Z98.890 OTHER SPECIFIED POSTPROCEDURAL STATES: Chronic | ICD-10-CM

## 2023-12-01 DIAGNOSIS — K80.10 CALCULUS OF GALLBLADDER WITH CHRONIC CHOLECYSTITIS WITHOUT OBSTRUCTION: ICD-10-CM

## 2023-12-01 DIAGNOSIS — J45.909 UNSPECIFIED ASTHMA, UNCOMPLICATED: ICD-10-CM

## 2023-12-01 DIAGNOSIS — K80.20 CALCULUS OF GALLBLADDER WITHOUT CHOLECYSTITIS WITHOUT OBSTRUCTION: ICD-10-CM

## 2023-12-01 PROCEDURE — C9399: CPT

## 2023-12-01 PROCEDURE — 88304 TISSUE EXAM BY PATHOLOGIST: CPT | Mod: 26

## 2023-12-01 PROCEDURE — 88304 TISSUE EXAM BY PATHOLOGIST: CPT

## 2023-12-01 PROCEDURE — C1889: CPT

## 2023-12-01 RX ORDER — HYDROMORPHONE HYDROCHLORIDE 2 MG/ML
0.5 INJECTION INTRAMUSCULAR; INTRAVENOUS; SUBCUTANEOUS
Refills: 0 | Status: DISCONTINUED | OUTPATIENT
Start: 2023-12-01 | End: 2023-12-01

## 2023-12-01 RX ORDER — HYDROMORPHONE HYDROCHLORIDE 2 MG/ML
1 INJECTION INTRAMUSCULAR; INTRAVENOUS; SUBCUTANEOUS
Refills: 0 | Status: DISCONTINUED | OUTPATIENT
Start: 2023-12-01 | End: 2023-12-01

## 2023-12-01 RX ORDER — ONDANSETRON 8 MG/1
4 TABLET, FILM COATED ORAL ONCE
Refills: 0 | Status: DISCONTINUED | OUTPATIENT
Start: 2023-12-01 | End: 2023-12-01

## 2023-12-01 RX ORDER — OXYCODONE AND ACETAMINOPHEN 5; 325 MG/1; MG/1
1 TABLET ORAL
Qty: 20 | Refills: 0
Start: 2023-12-01 | End: 2023-12-05

## 2023-12-01 RX ADMIN — HYDROMORPHONE HYDROCHLORIDE 0.5 MILLIGRAM(S): 2 INJECTION INTRAMUSCULAR; INTRAVENOUS; SUBCUTANEOUS at 17:10

## 2023-12-01 NOTE — ASU DISCHARGE PLAN (ADULT/PEDIATRIC) - ASU DC SPECIAL INSTRUCTIONSFT
Diet : You may resume your regular diet. It is recommended to eat a low fat diet and increase fluid intake to avoid constipation.  Diarrhea post op is not unexpected. If you are constipated, take stool softeners if needed to avoid straining with stool.    Pain : Take Percocet as needed for severe pain every 6 hours. Unless otherwise specified prior or by a physician, you may alternate with Motrin every 8 hours. Do not exceed more than 4000mg of Tylenol (acetaminophen) per day. Do not take Motrin if you are allergic to NSAIDs.    Activity : No heavy lifting for 6 weeks (no lifting more than ~10-15 pounds). No tub baths, no pool or ocean for at least 2 weeks. Do not submerge the incisions in water. You may shower in 1-2 days and let water run over the incision, but do not submerge.    Dressing : You may shower with dressings , do not remove, you have skin glue underneath the gauze. if the gauze comes off then put new gauze and tape over the incision. empty the drain as needed (when it fills up) and record amount and color. Will plan to remove the drain in the office on this coming Tuesday 12/5/23    Follow up : Follow up with Dr. Holman in the office on Tuesday Call the office if you have any questions or concerns.    SEEK IMMEDIATE MEDICAL CARE IF YOU HAVE ANY OF THE FOLLOWING SYMPTOMS: fever >101F, persistent vomiting, yellowing of the skin or eyes, or altered mental status.

## 2023-12-01 NOTE — ASU DISCHARGE PLAN (ADULT/PEDIATRIC) - NS MD DC FALL RISK RISK
For information on Fall & Injury Prevention, visit: [email or web address] OR  [email or web address] OR  https://www.cdc.gov/steadi/patient.html

## 2023-12-01 NOTE — ASU DISCHARGE PLAN (ADULT/PEDIATRIC) - CARE PROVIDER_API CALL
Kelsea Holman  Surgery  [address] [place] [address], [address]  [place] [address]  Phone: [phone]  Fax: [phone]  Follow Up Time:

## 2023-12-01 NOTE — CHART NOTE - NSCHARTNOTEFT_GEN_A_CORE
PACU ANESTHESIA ADMISSION NOTE      Procedure: Laparoscopic cholecystectomy      Post op diagnosis:  Acute cholecystitis            __x__  Patent Airway    __x__  Full return of protective reflexes    _x___  Full recovery from anesthesia / back to baseline     Vitals:   T:    97.4       R:      12            BP:      173/81            Sat:    100%               P: 65      Mental Status:  ___x_ Awake   __x___ Alert   _____ Drowsy   _____ Sedated    Nausea/Vomiting:  ____ NO  ______Yes,   x   See Post - Op Orders          Pain Scale (0-10):  _____    Treatment: ____ None    _x___ See Post - Op/PCA Orders    Post - Operative Fluids:   ____ Oral   ___x_ See Post - Op Orders    Plan: Discharge:   ____Home       __x___Floor     _____Critical Care    _____  Other:_________________    Comments:    Pt tolerated procedure well, no anesthesia related complications. Care of pt endorsed to PACU, report given to PACU RN. Discharge when criteria are met.

## 2023-12-01 NOTE — BRIEF OPERATIVE NOTE - OPERATION/FINDINGS
large, firm mass like structure within gallbladder lumen, possibly a large calcified gallstone. cystic duct and artery clipped. hemostasis achieved. drain placed in gallbaldder fossa

## 2023-12-08 LAB
SURGICAL PATHOLOGY STUDY: SIGNIFICANT CHANGE UP

## 2023-12-19 NOTE — PATIENT PROFILE ADULT - DO YOU FEEL THREATENED BY OTHERS?
ONCOLOGY / HEMATOLOGY FOLLOW  UP NOTE    Ms. Sierra Kat was seen at [facility], she is a 70 year old female with:    ONCOLOGY PROBLEMS:  Patient Active Problem List   Diagnosis    Essential hypertension    Invasive ductal carcinoma of breast, right (CMD)    BRCA2 positive    Chemotherapy induced neutropenia (CMD)    Shortness of breath    Iron deficiency anemia        Chief Complaint   Patient presents with    Breast Cancer         INTERVAL HISTORY:  Ms. Sierra Kat is here for a follow up appointment.   Per patient she is feeling well and tolerated chemotherapy fairly well.  She felt very tired and fatigued for approximately 1 week after the chemotherapy but now has recovered well.  She would minimal mucositis which has resolved.  She denies any hematochezia melena or hematemesis.  She denies any shortness of breath, rashes or diarrhea.  Overall she is tolerating treatment well.  Please refer to review of systems below and pertinent positive findings are highlighted:    PAST MEDICAL HISTORY:    Reviewed in patient's Epic chart.    FAMILY HISTORY:  Reviewed in patient's Epic chart.    Social History     Tobacco Use   Smoking Status Former    Current packs/day: 0.05    Average packs/day: 0.1 packs/day for 49.8 years (2.5 ttl pk-yrs)    Types: Cigarettes    Start date: 2/21/1974   Smokeless Tobacco Former   Tobacco Comments    3-4 cigarettes a week      REVIEW OF SYSTEMS  GENERAL:  Patient denies headache, fevers, chills, night sweats, excessive fatigue, change in appetite, weight loss, dizziness  ALLERGIC/IMMUNOLOGIC: Verified allergies: Yes  EYES:  Patient denies significant visual difficulties, double vision, blurred vision  ENT/MOUTH: Patient denies problems with hearing, sore throat, sinus drainage, but complains of: mouth sores  ENDOCRINE:  Patient denies diabetes, thyroid disease, hormone replacement, hot flashes  HEMATOLOGIC/LYMPHATIC: Patient denies easy bruising, bleeding, tender lymph  nodes, swollen lymph nodes  BREASTS: Patient denies abnormal masses of breast, nipple discharge, pain  RESPIRATORY:  Patient denies lung pain with breathing, cough, coughing up blood, shortness of breath  CARDIOVASCULAR:  Patient denies anginal chest pain, palpitations, shortness of breath when lying flat, peripheral edema  GASTROINTESTINAL: Patient denies abdominal pain , nausea, vomiting, diarrhea, GI bleeding, constipation, change in bowel habits, heartburn, sensation of feeling full, difficulty swallowing  : Patient denies abnormal genital masses, blood in the urine, frequency, urgency, burning with urination, hesitancy, incontinence, vaginal bleeding, discharge  MUSCULOSKELETAL:  Patient denies joint pain, bone pain, joint swelling, redness, decreased range of motion  SKIN:  Patient denies chronic rashes, inflammation, ulcerations, skin changes, itching  NEUROLOGIC:  Patient denies loss of balance, areas of focal weakness, abnormal gait, sensory problems, numbness, tingling  PSYCHIATRIC: Patient denies insomnia, depression, anxiety    PHYSICAL EXAMINATION:  Sierra Kat is a 70 year old female who is alert, oriented times 3, in no apparent distress.  VITALS:    Visit Vitals  /80   Pulse 87   Temp 98.2 °F (36.8 °C) (Oral)   Resp 18   Ht 5' 5\" (1.651 m)   Wt 83 kg (183 lb)   SpO2 95%   BMI 30.45 kg/m²        ECO-2  HEENT:  Anicteric sclerae with no oral lesions.  NECK:  Supple with no cervical or supraclavicular lymphadenopathy. She has Mediport.  LUNGS:  Clear to auscultation bilaterally.  There is no evidence of wheezing or rales on auscultation.   CARDIOVASCULAR:  Regular rate and rhythm.  No S3, S4 or any murmurs.    BREASTS:  Right breast:  Biopsy changes with no definate palpable mass from 1-2 o'clock position. here is no axillary lymphadenopathy.  Left breast:  Skin and nipple normal.  No palpable masses.  No axillary lymphadenopathy.  ABDOMEN:  Soft, nontender, no hepatosplenomegaly.  Bowel  sounds are normal.  There is no evidence of abdominal bruit.  No abnormal masses are palpable.  EXTREMITIES:  No cyanosis, clubbing or edema.    LYMPHATIC SYSTEM:  There is no cervical, supraclavicular or axillary lymphadenopathy.    SKIN:  No bruises or petechiae seen.      DATA REVIEWED:  Recent Labs   Lab 12/19/23  1021 12/12/23  1118 12/05/23  1109 11/28/23  1009 11/21/23  1047 11/13/23  1356   WBC 6.0 5.8 5.6 4.3 4.2 7.8   RBC 3.55* 3.43* 3.31* 3.30* 3.17* 3.17*   HGB 11.6* 11.2* 10.8* 10.9* 10.2* 10.1*   HCT 34.1* 33.4* 32.9* 32.2* 31.2* 30.6*   MCV 96.1 97.4 99.4 97.6 98.4 96.5    220 128* 245 200 211   Absolute Neutrophils 3.8 3.5  --  2.2 2.1 5.8   Absolute Lymphocytes 1.3 1.3  --  1.5 1.5 1.7   Absolute Monocytes 0.8 0.8  --  0.5 0.5 0.2*   Absolute Eosinophils  0.0 0.0  --  0.0 0.0 0.0   Absolute Basophils 0.0 0.1  --  0.0 0.0 0.1       Recent Labs   Lab 12/19/23  1021 12/12/23  1118 12/05/23  1109 11/28/23  1009 11/13/23  1356   Glucose 123* 110* 160* 131* 97   Sodium 140 141 140 139 141   Potassium 3.6 3.6 3.6 3.8 3.7   Chloride 105 105 105 105 104   BUN 13 14 15 14 15   Creatinine 0.57 0.55 0.46* 0.50* 0.46*   Calcium 9.0 8.9 8.9 9.0 9.7   Albumin 3.4* 3.6 3.4* 3.3* 3.2*   GOT/AST 16 9 14 14 7   Bilirubin, Total 0.5 0.3 0.3 0.3 0.2   Alkaline Phosphatase 94 113 126* 92 151*   GPT 21 17 20 27 21   Globulin 3.9 3.8 3.7 3.6 4.1*     Recent Labs   Lab 10/26/23  0814 10/10/23  0928 10/03/23  1013 09/26/23  0946 09/19/23  0840   Magnesium 2.2 2.0 2.1 1.6* 1.9     Recent Labs   Lab 10/27/23  1020   Retic Count 2.7*   Retic ABS 56   Iron 17*   Iron Binding Capacity 198*   Iron, Percent Saturation 9*   Vitamin B12 1,616*   Folate 19.7     Imaging Studies:  NM BONE SCAN WHOLE BODY  Result Date: 7/5/2023  FINDINGS: Focus of uptake overlying the left radius or ulna. Degenerative changes associated with the bilateral shoulders, wrists, hands, knees, hips and ankles/feet. Degenerative uptake along the  sternoclavicular joint and first/second costosternal margin. Focus of uptake the right inferior endplate of L4 corresponds to prominent degenerative osteophyte at the L4-5 disc space. Multilevel degenerative facet hypertrophy throughout the thoracic and lumbar spine. Soft tissue contours are unremarkable. Both kidneys are visualized. Urinary contamination.   IMPRESSION: 1.  No definite scintigraphic evidence for osseous metastatic disease. Focus of uptake overlying the left radius/ulna, potentially sequela from left antecubital fossa injection; however, would be better evaluated with radiographs to exclude underlying lesion. 2.  Multi-articular degenerative changes throughout the axial and appendicular skeleton.      EXAM:  US BREAST DIAGNOSTIC 1-3 QUADRANTS RIGHT.   DATE OF EXAM:  10/6/2023 1:40 PM.  COMPARISON EXAMS: 8/23/2023, 6/30/2023, 6/23/2023, 6/21/2023, 7/26/2010.  CLINICAL INDICATION: Right breast malignancy. Patient currently being  treated with chemotherapy. We are asked to perform evaluation of the site  of biopsy-proven malignancy for response as well as evaluation of the right  axilla..  TECHNIQUE:  US BREAST DIAGNOSTIC 1-3 QUADRANTS RIGHT.  FINDINGS: Targeted ultrasound to the right breast and right axilla was  performed. At the 12 o'clock position 3 cm from the nipple there is again  noted a microclip at site of biopsy-proven malignancy. The soft tissue mass  associated with the biopsy-proven malignancy is felt to be significantly  decreased in overall size from initial examination and is difficult to  define separate from normal surrounding breast tissue. This is estimated to  measure 1.0 x 0.7 x 1.1 cm (previous 2.1 x 1.4 x 1.9 cm).  Imaging of the right axilla demonstrates the previously defined lymph node  to have a normal cortical thickness with fatty hilus which is improved from  previous. No new abnormal lymph nodes are identified.  IMPRESSION: Continued positive response to neoadjuvant  therapy right breast  malignancy with continued reduction in volume of the biopsy-proven  malignancy. Right axillary lymph node appears normal with current exam.  RECOMMENDATIONS: Continued definitive surgical/oncologic therapy for  biopsy-proven right breast malignancy  BI-RADS: 6 - Known, Biopsy-Proven Malignancy.           Pathologic Diagnosis                                ** FINAL DIAGNOSIS **     A.   Lymph node, right axillary, 11:00 and 10 cm from the nipple, core biopsy:  - Metastatic poorly differentiated invasive ductal carcinoma (focus measures 4 mm).  - No extranodal extension identified.     B.   Breast, right, 12:00 and 3 cm from the nipple, core biopsy:  - Poorly differentiated invasive ductal carcinoma.  See diagnosis comment for complete details.   Electronically signed by Nitesh Coppola MD on 6/27/2023 at 1014   Preliminary result electronically signed by Nitesh Coppola MD on 6/26/2023 at 1339           Comments:   Edited result: Previously reported as [Previous value contains rich text formatting which cannot be displayed here] (see Result History) on 6/26/2023 at 1339 CDT.      Comment   [facility]   INVASIVE CARCINOMA OF THE BREAST: Biopsy  SITE: Right breast 12:00 and 3 cm from the nipple  INVASIVE CARCINOMA  HISTOLOGIC TYPE: Invasive ductal carcinoma  HISTOLOGIC GRADE: Poorly-differentiated                 Tubular Differentiation:       3/3                 Nuclear Pleomorphism:      3/3                 Mitotic Rate:                        3/3                 Figueroa Score:              9/9  GREATEST EXTENT OF INVASIVE CARCINOMA (in any single core): 1.8 cm  LYMPHOVASCULAR INVASION:  Not Identified     DUCTAL CARCINOMA IN SITU: Not Identified  MICROCALCIFICATIONS:  Not Identified   PROGNOSTIC MARKERS:   See addendum for complete details     Color ink identified: Uninked      Microscopic Description   . [facility]   A.  Stained sections reveal fragments of lymph node with a  single focus of metastatic poorly differentiated base of ductal carcinoma.  No extranodal extension is identified.     B.  Stained sections reveal fragments of breast tissue with extensive involvement by infiltrating nests of poorly differentiated tumor cells.  Immunohistochemical stains were performed with adequate controls on block B2 and the results are as follows:     Stain: CK7     -  Interpretation: POSITIVE; strong cytoplasmic staining of tumor cells  Stain: GATA3     -  Interpretation: Negative  Stain: SOX10     -  Interpretation: POSITIVE; strong nuclear staining of tumor cells  Stain: E-cadherin     -  Interpretation: Strong cytoplasmic staining of tumor cells  Stain: P120     -  Interpretation: Strong membranous staining of tumor cells     These immunohistochemical staining results are consistent with poorly differentiated base of ductal carcinoma. Raj Phillips MD has reviewed this case and concurs with the interpretation.         BREAST BIOMARKERS  Block: B2   Estrogen Receptor: NEGATIVE         Susy Score 0 (0 + 0); 0% of tumor nuclei, none  Progesterone Receptor: NEGATIVE         Susy Score 0 (0 + 0); 0% of tumor nuclei, none  HER2 by IHC: NEGATIVE (1+)     Addendum: IHC Prognostic Marker Result   A. BREAST BIOMARKERS  Block: A1 (lymph node, right axillary, 11:00 in 10 cm from the nipple)     Estrogen Receptor: NEGATIVE         Susy Score 0 (0 + 0); 0% of tumor nuclei, none     Progesterone Receptor: NEGATIVE         Susy Score 0 (0 + 0); 0% of tumor nuclei, none     HER2 by IHC: NEGATIVE (1+)         ASSESSMENT:  Patient is a 70 year old female with following Hematology Oncology concerns:  Invasive ductal carcinoma of the right breast upper outer quadrant, Stage IIIB pT2 cN1 cM0, ER (0%)/GA(0%) HER2 (+1)-negative, grade 3. 2.7 x 2.2 x 2.3 cm. 3 abnormal LNs on MRI.   Surgery: Planned with Dr. Casillas following neoadjuvant chemotherapy.   Chemotherapy: Recommend CARBOPLATIN AUC=5 + PACLITAXEL  80 MG/M² D1,8,15 + PEMBROLIZUMAB 200 MG IV D1 + G-CSF EVERY 21 DAYS X 12 WEEKS FOLLOWED BY AC + PEMBROLIZUMAB 200 MG + G-CSF EVERY 21 DAYS X 12 WEEKS  (weekly Taxol delayed 1 week as it was held on 8/15 due to COVID infection)  Cycle 1 day 1 given 07/18/2023. C2D15 held because of COVID infection  Ultrasound breast performed 08/23/2023 after 2 cycle of chemotherapy showed the breast mass to be measuring 2.1 cm down from 2.8 cm and the lymph node measured 1.4 cm with cortex measuring 3 mm and previously 5 mm  Patient received pembrolizumab doxorubicin and cyclophosphamide 10/10/2023 with significant pancytopenia, mildly elevated LFTs, low-grade fever and chills.  Patient was admitted because of weakness.  Symptoms improved with blood transfusion.  Workup showed significant iron-deficiency anemia with etiology of fever and mildly elevated LFTs remained unclear. 11/07/2023 she was treated with C2 AC with doxorubicin at 48mg/m2 and cyclophosphamide 480mg/m2 and pembrolizumab was held.  Pembrolizumab was given with C3 tolerated well without any significant complaints.    Patient is here for cycle 4 of doxorubicin and cyclophosphamide.  I will proceed with the same doses of cycle 3.  Pembrolizumab will be given as it was tolerated well.  Patient and her  had multiple questions.  NCCN guideline and chemotherapy regimen was reviewed again in detail.    Further imaging to be ordered by Dr. Casillas the prior to the surgery.  Patient can proceed with surgery in the next 2-4 weeks    Radiation: To be discussed following surgery  Endocrine Therapy: not indicated     Discussed with patient the natural history, course and prognosis of illness.    Therapeutic options discussed and explained.  Side effects, risks and benefits, and alternatives discussed.  Patient acknowledges understanding of disease and treatment plan.    2. Cardio Oncology: Echo completed 7/5/23.  EF 71%. Normal LV size. Grade 1 diastolic dysfunction.      3. Genetics: BRCA2 positive. She has discussed this with family and has found multiple members to be positive as well. Discussed possibility of PARP inhibitor in the adjuvant setting pending pathology.     4.  Iron-deficiency anemia:  Patient has received iron sucrose with improvement of her hemoglobin.  Once again, I had an extensive discussion with the patient and her .  Iron-deficiency anemia is not a side effect of chemotherapy and we need to complete the GI workup in the near future.  Patient was referred to Dr. Hahn    Other Medical Problems:  Hypertension    PLAN:   AC C4D1 and pembro today  GCSG 480MCG STARTING D3 X 5 DAYS-[facility]. ON WEEKEND [facility]  CBC, CMP on DAY 8 AND D15  RTC IN 3 WEEKS CBC, CMP, TSH with reflex, cortiol and ACTH PEMBRO  See DR. Casillas soon to finalize surgery      Kiersten Gilbert MD      Note to Patient: The 21st Century Cures Act makes medical notes like these available to patients in the interest of transparency. However, be advised this is a medical document. It is intended as ensu-ur-kxda communication. It is written in medical language and may contain abbreviations or verbiage that are unfamiliar. It may appear blunt or direct. Medical documents are intended to carry relevant information, facts as evident, and the clinical opinion of the practitioner.  This note may have been transcribed using a voice dictation system. Voice recognition errors may occur. This should not be taken to alter the content or meaning of this note.    no

## 2024-02-22 NOTE — ASU DISCHARGE PLAN (ADULT/PEDIATRIC) - FOLLOW UP APPOINTMENTS
[facility],  Endoscopy/Ambulatory Surgery [facility]
Oriented - self; Oriented - place; Oriented - time

## 2024-07-17 PROBLEM — G47.33 OBSTRUCTIVE SLEEP APNEA (ADULT) (PEDIATRIC): Chronic | Status: ACTIVE | Noted: 2023-11-24

## 2024-07-17 PROBLEM — J45.909 UNSPECIFIED ASTHMA, UNCOMPLICATED: Chronic | Status: ACTIVE | Noted: 2023-11-24

## 2024-07-17 PROBLEM — H54.8 LEGAL BLINDNESS, AS DEFINED IN USA: Chronic | Status: ACTIVE | Noted: 2023-11-24

## 2024-07-17 PROBLEM — M81.0 AGE-RELATED OSTEOPOROSIS WITHOUT CURRENT PATHOLOGICAL FRACTURE: Chronic | Status: ACTIVE | Noted: 2023-11-24

## 2024-07-17 PROBLEM — Z87.898 PERSONAL HISTORY OF OTHER SPECIFIED CONDITIONS: Chronic | Status: ACTIVE | Noted: 2023-11-24

## 2024-08-11 ENCOUNTER — OUTPATIENT (OUTPATIENT)
Dept: OUTPATIENT SERVICES | Facility: HOSPITAL | Age: 79
LOS: 1 days | End: 2024-08-11
Payer: MEDICARE

## 2024-08-11 DIAGNOSIS — R91.1 SOLITARY PULMONARY NODULE: ICD-10-CM

## 2024-08-11 DIAGNOSIS — Z00.8 ENCOUNTER FOR OTHER GENERAL EXAMINATION: ICD-10-CM

## 2024-08-11 DIAGNOSIS — Z98.890 OTHER SPECIFIED POSTPROCEDURAL STATES: Chronic | ICD-10-CM

## 2024-08-11 PROCEDURE — 71250 CT THORAX DX C-: CPT | Mod: 26

## 2024-08-11 PROCEDURE — 71250 CT THORAX DX C-: CPT

## 2024-08-12 DIAGNOSIS — R91.1 SOLITARY PULMONARY NODULE: ICD-10-CM

## 2025-01-27 NOTE — H&P ADULT - NSHPPHYSICALEXAM_GEN_ALL_CORE
Detail Level: Zone
VITALS:  T(F): 99.2 (10-02-23 @ 08:14), Max: 99.2 (10-02-23 @ 08:14)  HR: 90 (10-02-23 @ 08:14) (90 - 90)  BP: 142/82 (10-02-23 @ 08:14) (142/82 - 142/82)  RR: 18 (10-02-23 @ 08:14) (18 - 18)  SpO2: 97% (10-02-23 @ 08:14) (97% - 97%)    PHYSICAL EXAM:  General: NAD, AAOx3, calm and cooperative  HEENT: NCAT, STANISLAW, EOMI, Trachea ML, Neck supple  Cardiac: RRR S1, S2, no Murmurs, rubs or gallops  Respiratory: CTAB, normal respiratory effort, breath sounds equal BL, no wheeze, rhonchi or crackles, on 2L /min  Abdomen: Soft, non-distended, midly tender to LUQ, no rebound, no guarding. +BS.  Rectal: Good tone, +stool, no blood, no simone-anal masses/lesions, no fistulas, fissures, hemorrhoids  Musculoskeletal: Strength 5/5 BL UE/LE, ROM intact, compartments soft  Neuro: Sensation grossly intact and equal throughout, no focal deficits  Vascular: Pulses 2+ throughout, extremities well perfused  Skin: Warm/dry, normal color, no jaundice